# Patient Record
Sex: FEMALE | Race: WHITE | NOT HISPANIC OR LATINO | Employment: FULL TIME | URBAN - METROPOLITAN AREA
[De-identification: names, ages, dates, MRNs, and addresses within clinical notes are randomized per-mention and may not be internally consistent; named-entity substitution may affect disease eponyms.]

---

## 2024-10-02 ENCOUNTER — HOSPITAL ENCOUNTER (OUTPATIENT)
Dept: RADIOLOGY | Facility: MEDICAL CENTER | Age: 34
End: 2024-10-02
Attending: STUDENT IN AN ORGANIZED HEALTH CARE EDUCATION/TRAINING PROGRAM
Payer: COMMERCIAL

## 2024-10-02 DIAGNOSIS — Z12.5 SPECIAL SCREENING FOR MALIGNANT NEOPLASM OF PROSTATE: ICD-10-CM

## 2024-10-02 DIAGNOSIS — Z80.3 FAMILY HISTORY OF MALIGNANT NEOPLASM OF BREAST: ICD-10-CM

## 2024-10-02 PROCEDURE — 77067 SCR MAMMO BI INCL CAD: CPT

## 2024-10-14 ENCOUNTER — HOSPITAL ENCOUNTER (OUTPATIENT)
Dept: RADIOLOGY | Facility: MEDICAL CENTER | Age: 34
End: 2024-10-14
Attending: STUDENT IN AN ORGANIZED HEALTH CARE EDUCATION/TRAINING PROGRAM
Payer: COMMERCIAL

## 2024-10-14 DIAGNOSIS — R92.8 ABNORMAL MAMMOGRAM: ICD-10-CM

## 2024-10-14 PROCEDURE — 76642 ULTRASOUND BREAST LIMITED: CPT | Mod: LT

## 2024-10-15 ENCOUNTER — HOSPITAL ENCOUNTER (OUTPATIENT)
Dept: RADIOLOGY | Facility: MEDICAL CENTER | Age: 34
End: 2024-10-15
Attending: STUDENT IN AN ORGANIZED HEALTH CARE EDUCATION/TRAINING PROGRAM
Payer: COMMERCIAL

## 2024-10-15 DIAGNOSIS — R92.8 ABNORMAL FINDING ON BREAST IMAGING: ICD-10-CM

## 2024-10-15 LAB — PATHOLOGY CONSULT NOTE: NORMAL

## 2024-10-15 PROCEDURE — 88305 TISSUE EXAM BY PATHOLOGIST: CPT

## 2024-10-15 PROCEDURE — 19083 BX BREAST 1ST LESION US IMAG: CPT | Mod: LT

## 2024-10-16 ENCOUNTER — TELEPHONE (OUTPATIENT)
Dept: RADIOLOGY | Facility: MEDICAL CENTER | Age: 34
End: 2024-10-16
Payer: COMMERCIAL

## 2024-12-10 ENCOUNTER — TELEPHONE (OUTPATIENT)
Dept: MATERNAL FETAL MEDICINE | Facility: MEDICAL CENTER | Age: 34
End: 2024-12-10

## 2024-12-10 ENCOUNTER — ANCILLARY PROCEDURE (OUTPATIENT)
Dept: MATERNAL FETAL MEDICINE | Facility: MEDICAL CENTER | Age: 34
End: 2024-12-10
Payer: COMMERCIAL

## 2024-12-10 VITALS
HEART RATE: 121 BPM | BODY MASS INDEX: 20.99 KG/M2 | WEIGHT: 150.4 LBS | DIASTOLIC BLOOD PRESSURE: 95 MMHG | SYSTOLIC BLOOD PRESSURE: 159 MMHG

## 2024-12-10 DIAGNOSIS — R03.0 ELEVATED BLOOD PRESSURE READING: ICD-10-CM

## 2024-12-10 DIAGNOSIS — R03.0 ELEVATED BLOOD-PRESSURE READING, WITHOUT DIAGNOSIS OF HYPERTENSION: ICD-10-CM

## 2024-12-10 DIAGNOSIS — Z3A.12 12 WEEKS GESTATION OF PREGNANCY: ICD-10-CM

## 2024-12-10 DIAGNOSIS — O09.521 MULTIGRAVIDA OF ADVANCED MATERNAL AGE IN FIRST TRIMESTER: ICD-10-CM

## 2024-12-10 DIAGNOSIS — O09.521 AMA (ADVANCED MATERNAL AGE) MULTIGRAVIDA 35+, FIRST TRIMESTER: ICD-10-CM

## 2024-12-10 DIAGNOSIS — Z14.8 CARRIER OF GENETIC DISORDER: ICD-10-CM

## 2024-12-10 DIAGNOSIS — O09.511 PRIMIGRAVIDA OF ADVANCED MATERNAL AGE IN FIRST TRIMESTER: ICD-10-CM

## 2024-12-10 LAB
APPEARANCE UR: CLEAR
BILIRUB UR STRIP-MCNC: NEGATIVE MG/DL
COLOR UR AUTO: YELLOW
GLUCOSE UR STRIP.AUTO-MCNC: NEGATIVE MG/DL
KETONES UR STRIP.AUTO-MCNC: NEGATIVE MG/DL
LEUKOCYTE ESTERASE UR QL STRIP.AUTO: NEGATIVE
NITRITE UR QL STRIP.AUTO: NEGATIVE
PH UR STRIP.AUTO: 7.5 [PH] (ref 5–8)
PROT UR QL STRIP: NORMAL MG/DL
RBC UR QL AUTO: NEGATIVE
SP GR UR STRIP.AUTO: 1.02
UROBILINOGEN UR STRIP-MCNC: NORMAL MG/DL

## 2024-12-10 PROCEDURE — 99204 OFFICE O/P NEW MOD 45 MIN: CPT | Performed by: OBSTETRICS & GYNECOLOGY

## 2024-12-10 PROCEDURE — 81002 URINALYSIS NONAUTO W/O SCOPE: CPT | Performed by: OBSTETRICS & GYNECOLOGY

## 2024-12-10 PROCEDURE — 76813 OB US NUCHAL MEAS 1 GEST: CPT | Performed by: OBSTETRICS & GYNECOLOGY

## 2024-12-10 PROCEDURE — 76801 OB US < 14 WKS SINGLE FETUS: CPT | Performed by: OBSTETRICS & GYNECOLOGY

## 2024-12-10 NOTE — TELEPHONE ENCOUNTER
Patient brought in home blood pressure machine to cross check with ours. These were her readings.  Ours: 165/98  Her's:161/112

## 2024-12-13 ENCOUNTER — TELEPHONE (OUTPATIENT)
Dept: MATERNAL FETAL MEDICINE | Facility: MEDICAL CENTER | Age: 34
End: 2024-12-13
Payer: COMMERCIAL

## 2024-12-13 NOTE — TELEPHONE ENCOUNTER
I called patient to get her blood pressure reports from the last time she was in Office due to her being hypertensive. These were her reports.  Tuesday- 6:50 /74  Wednesday- 7:52 /77, 5:50 /80  Thursday- 7:47 /76, 12:11 /80, 7:30 /71  Friday- 9:20 /82

## 2025-01-21 ENCOUNTER — APPOINTMENT (OUTPATIENT)
Dept: MATERNAL FETAL MEDICINE | Facility: MEDICAL CENTER | Age: 35
End: 2025-01-21
Payer: COMMERCIAL

## 2025-01-21 VITALS
DIASTOLIC BLOOD PRESSURE: 93 MMHG | BODY MASS INDEX: 21.48 KG/M2 | HEART RATE: 142 BPM | WEIGHT: 153.9 LBS | SYSTOLIC BLOOD PRESSURE: 159 MMHG

## 2025-01-21 DIAGNOSIS — O35.BXX0 FETAL CARDIAC ANOMALY COMPLICATING PREGNANCY, ANTEPARTUM, NOT APPLICABLE OR UNSPECIFIED FETUS: ICD-10-CM

## 2025-01-21 DIAGNOSIS — O09.511 PRIMIGRAVIDA OF ADVANCED MATERNAL AGE IN FIRST TRIMESTER: ICD-10-CM

## 2025-01-21 DIAGNOSIS — Z3A.18 18 WEEKS GESTATION OF PREGNANCY: ICD-10-CM

## 2025-01-21 DIAGNOSIS — O09.522 AMA (ADVANCED MATERNAL AGE) MULTIGRAVIDA 35+, SECOND TRIMESTER: ICD-10-CM

## 2025-01-21 PROCEDURE — 76817 TRANSVAGINAL US OBSTETRIC: CPT | Performed by: OBSTETRICS & GYNECOLOGY

## 2025-01-21 PROCEDURE — 76811 OB US DETAILED SNGL FETUS: CPT | Performed by: OBSTETRICS & GYNECOLOGY

## 2025-01-21 PROCEDURE — 99212 OFFICE O/P EST SF 10 MIN: CPT | Performed by: OBSTETRICS & GYNECOLOGY

## 2025-05-06 ENCOUNTER — NON-PROVIDER VISIT (OUTPATIENT)
Dept: MATERNAL FETAL MEDICINE | Facility: MEDICAL CENTER | Age: 35
End: 2025-05-06
Payer: COMMERCIAL

## 2025-05-06 ENCOUNTER — APPOINTMENT (OUTPATIENT)
Dept: MATERNAL FETAL MEDICINE | Facility: MEDICAL CENTER | Age: 35
End: 2025-05-06
Attending: OBSTETRICS & GYNECOLOGY
Payer: COMMERCIAL

## 2025-05-06 ENCOUNTER — HOSPITAL ENCOUNTER (EMERGENCY)
Facility: MEDICAL CENTER | Age: 35
End: 2025-05-06
Attending: OBSTETRICS & GYNECOLOGY | Admitting: OBSTETRICS & GYNECOLOGY
Payer: COMMERCIAL

## 2025-05-06 VITALS — SYSTOLIC BLOOD PRESSURE: 150 MMHG | DIASTOLIC BLOOD PRESSURE: 91 MMHG | HEART RATE: 103 BPM

## 2025-05-06 VITALS
DIASTOLIC BLOOD PRESSURE: 100 MMHG | SYSTOLIC BLOOD PRESSURE: 180 MMHG | BODY MASS INDEX: 24.34 KG/M2 | HEART RATE: 149 BPM | WEIGHT: 174.4 LBS

## 2025-05-06 DIAGNOSIS — O09.523 AMA (ADVANCED MATERNAL AGE) MULTIGRAVIDA 35+, THIRD TRIMESTER: ICD-10-CM

## 2025-05-06 DIAGNOSIS — Z3A.33 33 WEEKS GESTATION OF PREGNANCY: ICD-10-CM

## 2025-05-06 DIAGNOSIS — O47.03 PREMATURE UTERINE CONTRACTIONS, ANTEPARTUM, THIRD TRIMESTER: ICD-10-CM

## 2025-05-06 DIAGNOSIS — R03.0 ELEVATED BLOOD PRESSURE READING: ICD-10-CM

## 2025-05-06 DIAGNOSIS — O35.BXX0 FETAL CARDIAC ANOMALY COMPLICATING PREGNANCY, ANTEPARTUM, NOT APPLICABLE OR UNSPECIFIED FETUS: ICD-10-CM

## 2025-05-06 DIAGNOSIS — O09.511 PRIMIGRAVIDA OF ADVANCED MATERNAL AGE IN FIRST TRIMESTER: ICD-10-CM

## 2025-05-06 LAB
ALBUMIN SERPL BCP-MCNC: 3.4 G/DL (ref 3.2–4.9)
ALBUMIN/GLOB SERPL: 1.1 G/DL
ALP SERPL-CCNC: 96 U/L (ref 30–99)
ALT SERPL-CCNC: 15 U/L (ref 2–50)
ANION GAP SERPL CALC-SCNC: 12 MMOL/L (ref 7–16)
APPEARANCE UR: CLEAR
AST SERPL-CCNC: 16 U/L (ref 12–45)
BILIRUB SERPL-MCNC: <0.2 MG/DL (ref 0.1–1.5)
BILIRUB UR QL STRIP.AUTO: NEGATIVE
BILIRUB UR QL STRIP.AUTO: NEGATIVE
BILIRUB UR STRIP-MCNC: NEGATIVE MG/DL
BUN SERPL-MCNC: 11 MG/DL (ref 8–22)
CALCIUM ALBUM COR SERPL-MCNC: 9.6 MG/DL (ref 8.5–10.5)
CALCIUM SERPL-MCNC: 9.1 MG/DL (ref 8.5–10.5)
CHLORIDE SERPL-SCNC: 104 MMOL/L (ref 96–112)
CO2 SERPL-SCNC: 19 MMOL/L (ref 20–33)
COLOR UR AUTO: YELLOW
COLOR UR AUTO: YELLOW
COLOR UR: YELLOW
CREAT SERPL-MCNC: 0.64 MG/DL (ref 0.5–1.4)
CREAT UR-MCNC: 40.5 MG/DL
ERYTHROCYTE [DISTWIDTH] IN BLOOD BY AUTOMATED COUNT: 44.1 FL (ref 35.9–50)
FIBRONECTIN FETAL SPEC QL: NEGATIVE
GFR SERPLBLD CREATININE-BSD FMLA CKD-EPI: 118 ML/MIN/1.73 M 2
GLOBULIN SER CALC-MCNC: 3.2 G/DL (ref 1.9–3.5)
GLUCOSE SERPL-MCNC: 81 MG/DL (ref 65–99)
GLUCOSE UR QL STRIP.AUTO: NEGATIVE MG/DL
GLUCOSE UR STRIP.AUTO-MCNC: NEGATIVE MG/DL
GLUCOSE UR STRIP.AUTO-MCNC: NEGATIVE MG/DL
HCT VFR BLD AUTO: 35.8 % (ref 37–47)
HGB BLD-MCNC: 12.1 G/DL (ref 12–16)
KETONES UR QL STRIP.AUTO: NEGATIVE MG/DL
KETONES UR STRIP.AUTO-MCNC: NEGATIVE MG/DL
KETONES UR STRIP.AUTO-MCNC: NEGATIVE MG/DL
LEUKOCYTE ESTERASE UR QL STRIP.AUTO: NEGATIVE
MCH RBC QN AUTO: 31.5 PG (ref 27–33)
MCHC RBC AUTO-ENTMCNC: 33.8 G/DL (ref 32.2–35.5)
MCV RBC AUTO: 93.2 FL (ref 81.4–97.8)
MICRO URNS: NORMAL
NITRITE UR QL STRIP.AUTO: NEGATIVE
PH UR STRIP.AUTO: 7 [PH] (ref 5–8)
PLATELET # BLD AUTO: 287 K/UL (ref 164–446)
PMV BLD AUTO: 10.4 FL (ref 9–12.9)
POTASSIUM SERPL-SCNC: 3.8 MMOL/L (ref 3.6–5.5)
PROT SERPL-MCNC: 6.6 G/DL (ref 6–8.2)
PROT UR QL STRIP: NEGATIVE MG/DL
PROT UR QL STRIP: NEGATIVE MG/DL
PROT UR QL STRIP: NORMAL MG/DL
PROT UR-MCNC: <6 MG/DL (ref 0–15)
PROT/CREAT UR: 148 MG/G (ref 10–107)
RBC # BLD AUTO: 3.84 M/UL (ref 4.2–5.4)
RBC UR QL AUTO: NEGATIVE
SODIUM SERPL-SCNC: 135 MMOL/L (ref 135–145)
SP GR UR STRIP.AUTO: 1.01
SP GR UR STRIP.AUTO: 1.01 (ref 1–1.03)
SP GR UR STRIP.AUTO: 1.02
UROBILINOGEN UR STRIP-MCNC: 0.2 MG/DL
UROBILINOGEN UR STRIP.AUTO-MCNC: 0.2 EU/DL
UROBILINOGEN UR STRIP.AUTO-MCNC: 0.2 MG/DL
WBC # BLD AUTO: 14.7 K/UL (ref 4.8–10.8)

## 2025-05-06 PROCEDURE — 85027 COMPLETE CBC AUTOMATED: CPT

## 2025-05-06 PROCEDURE — 76816 OB US FOLLOW-UP PER FETUS: CPT | Performed by: OBSTETRICS & GYNECOLOGY

## 2025-05-06 PROCEDURE — 80053 COMPREHEN METABOLIC PANEL: CPT

## 2025-05-06 PROCEDURE — 82731 ASSAY OF FETAL FIBRONECTIN: CPT

## 2025-05-06 PROCEDURE — 59025 FETAL NON-STRESS TEST: CPT

## 2025-05-06 PROCEDURE — 81002 URINALYSIS NONAUTO W/O SCOPE: CPT | Performed by: OBSTETRICS & GYNECOLOGY

## 2025-05-06 PROCEDURE — 82570 ASSAY OF URINE CREATININE: CPT

## 2025-05-06 PROCEDURE — 84156 ASSAY OF PROTEIN URINE: CPT

## 2025-05-06 PROCEDURE — 36415 COLL VENOUS BLD VENIPUNCTURE: CPT

## 2025-05-06 PROCEDURE — 81002 URINALYSIS NONAUTO W/O SCOPE: CPT

## 2025-05-06 PROCEDURE — 59025 FETAL NON-STRESS TEST: CPT | Performed by: OBSTETRICS & GYNECOLOGY

## 2025-05-06 PROCEDURE — 99283 EMERGENCY DEPT VISIT LOW MDM: CPT

## 2025-05-06 PROCEDURE — 81003 URINALYSIS AUTO W/O SCOPE: CPT

## 2025-05-07 NOTE — PROGRESS NOTES
35 y.o.  @ 33.6 sent over from Dr. Boogie's office for pretrm contractions. Pt denies feeling UC's, reports positive fetal movement, denies vaginal bleeding, or LOF. EFM & [place] applied. Initial /87  Pt states she has anxiety when at Dr office, and her bp gets high in office. Pt tracks BP at home and has log.   PIH labs ordered. SSE FFN collected. SVE closed/thick/high.   Report to Marylou CARDONA RN. Care relinquished.

## 2025-05-07 NOTE — ED PROVIDER NOTES
Department of Obstetrics and Gynecology  Labor and Delivery Progress Note    ID: 35 y.o. , at 33+6    Primary Obsetrician: Viky Amador M.D.    CC: sent from [facility]'s office for possible  labor    S: Pt was seen at Dr. Boogie's office today for growth US for AMA. During the US exam uterine contractions were seen, and pt was placed on the monitor which showed contractions on toco. She was then sent up to L&D for  labor eval. Pt reports having felt that contraction during the US, but otherwise it's intermittent tightening or baby moving, nothing that would keep her awake at night or from working.    Pt has had multiple elevated Bps in the office, both with [facility] at at [facility] throughout her pregnancy. Pt has a home BP cuff that has been cross checked in Dr. Boogie's office. She brings her log with her that shows all normal BP. In the office BP is often 150s/90s. Today she denies pre-e symptoms.    O: BP Range 134/85 - 150/95  Gen: NAD, AAO x3  Ext: Cx visually closed per RN    FHT: 140 baseline, moderate variability, accels, no decels, reactive  [place]: 1-2 in 10 min avg'd over 20 min     Latest Reference Range & Units 25 16:40 25 17:25 25 17:51   WBC 4.8 - 10.8 K/uL   14.7 (H)   Hemoglobin 12.0 - 16.0 g/dL   12.1   Hematocrit 37.0 - 47.0 %   35.8 (L)   Platelet Count 164 - 446 K/uL   287   Creatinine 0.50 - 1.40 mg/dL   0.64   AST(SGOT) 12 - 45 U/L   16   ALT(SGPT) 2 - 50 U/L   15   Protein Creatinine Ratio 10 - 107 mg/g 148 (H)     Fetal Fibronectin   Negative    (H): Data is abnormally high  (L): Data is abnormally low    A/P: Mayra Vieira is a 35 y.o.  at 33+6.  AVSS.  Reactive FHT. Neg FFN  * Persistent elevated BP here but pt has maintained a detailed log with all normal BP at home. Advised pt to continue logging BP as she has been doing, and to come in if she has BP at home of 140/90 or higher. Reviewed pre-e symptoms to come in for.  * FFN is reassuring.  However, advised pt if ctxs become stronger and more frequent to return to L&D for labor check  * Will inform Dr. Amador of pt's eval in triage. Next appt scheduled in 1 week, will see if she wants to change that  The patient has received a screening exam, is not in labor, and is stable for discharge.    Radha Glasgow M.D., 5/6/2025, 7:58 PM

## 2025-05-28 ENCOUNTER — ANCILLARY PROCEDURE (OUTPATIENT)
Dept: MATERNAL FETAL MEDICINE | Facility: MEDICAL CENTER | Age: 35
End: 2025-05-28
Attending: OBSTETRICS & GYNECOLOGY
Payer: COMMERCIAL

## 2025-05-28 ENCOUNTER — HOSPITAL ENCOUNTER (EMERGENCY)
Facility: MEDICAL CENTER | Age: 35
End: 2025-05-28
Attending: OBSTETRICS & GYNECOLOGY | Admitting: OBSTETRICS & GYNECOLOGY
Payer: COMMERCIAL

## 2025-05-28 VITALS
TEMPERATURE: 99 F | OXYGEN SATURATION: 97 % | WEIGHT: 179 LBS | DIASTOLIC BLOOD PRESSURE: 77 MMHG | BODY MASS INDEX: 25.06 KG/M2 | HEIGHT: 71 IN | SYSTOLIC BLOOD PRESSURE: 141 MMHG | RESPIRATION RATE: 16 BRPM | HEART RATE: 118 BPM

## 2025-05-28 VITALS
DIASTOLIC BLOOD PRESSURE: 102 MMHG | SYSTOLIC BLOOD PRESSURE: 177 MMHG | HEART RATE: 150 BPM | WEIGHT: 179.1 LBS | BODY MASS INDEX: 24.99 KG/M2

## 2025-05-28 DIAGNOSIS — O09.511 PRIMIGRAVIDA OF ADVANCED MATERNAL AGE IN FIRST TRIMESTER: ICD-10-CM

## 2025-05-28 LAB
ALBUMIN SERPL BCP-MCNC: 3.4 G/DL (ref 3.2–4.9)
ALBUMIN/GLOB SERPL: 1 G/DL
ALP SERPL-CCNC: 117 U/L (ref 30–99)
ALT SERPL-CCNC: 17 U/L (ref 2–50)
ANION GAP SERPL CALC-SCNC: 11 MMOL/L (ref 7–16)
AST SERPL-CCNC: 21 U/L (ref 12–45)
BASOPHILS # BLD AUTO: 0.3 % (ref 0–1.8)
BASOPHILS # BLD: 0.03 K/UL (ref 0–0.12)
BILIRUB SERPL-MCNC: <0.2 MG/DL (ref 0.1–1.5)
BUN SERPL-MCNC: 12 MG/DL (ref 8–22)
CALCIUM ALBUM COR SERPL-MCNC: 10 MG/DL (ref 8.5–10.5)
CALCIUM SERPL-MCNC: 9.5 MG/DL (ref 8.5–10.5)
CHLORIDE SERPL-SCNC: 101 MMOL/L (ref 96–112)
CO2 SERPL-SCNC: 20 MMOL/L (ref 20–33)
CREAT SERPL-MCNC: 0.9 MG/DL (ref 0.5–1.4)
CREAT UR-MCNC: 48.9 MG/DL
EOSINOPHIL # BLD AUTO: 0.02 K/UL (ref 0–0.51)
EOSINOPHIL NFR BLD: 0.2 % (ref 0–6.9)
ERYTHROCYTE [DISTWIDTH] IN BLOOD BY AUTOMATED COUNT: 44.5 FL (ref 35.9–50)
GFR SERPLBLD CREATININE-BSD FMLA CKD-EPI: 85 ML/MIN/1.73 M 2
GLOBULIN SER CALC-MCNC: 3.4 G/DL (ref 1.9–3.5)
GLUCOSE SERPL-MCNC: 101 MG/DL (ref 65–99)
HCT VFR BLD AUTO: 38 % (ref 37–47)
HGB BLD-MCNC: 12.7 G/DL (ref 12–16)
IMM GRANULOCYTES # BLD AUTO: 0.06 K/UL (ref 0–0.11)
IMM GRANULOCYTES NFR BLD AUTO: 0.5 % (ref 0–0.9)
LYMPHOCYTES # BLD AUTO: 2.2 K/UL (ref 1–4.8)
LYMPHOCYTES NFR BLD: 18.8 % (ref 22–41)
MCH RBC QN AUTO: 31.3 PG (ref 27–33)
MCHC RBC AUTO-ENTMCNC: 33.4 G/DL (ref 32.2–35.5)
MCV RBC AUTO: 93.6 FL (ref 81.4–97.8)
MONOCYTES # BLD AUTO: 0.96 K/UL (ref 0–0.85)
MONOCYTES NFR BLD AUTO: 8.2 % (ref 0–13.4)
NEUTROPHILS # BLD AUTO: 8.45 K/UL (ref 1.82–7.42)
NEUTROPHILS NFR BLD: 72 % (ref 44–72)
NRBC # BLD AUTO: 0 K/UL
NRBC BLD-RTO: 0 /100 WBC (ref 0–0.2)
PLATELET # BLD AUTO: 303 K/UL (ref 164–446)
PMV BLD AUTO: 10.4 FL (ref 9–12.9)
POTASSIUM SERPL-SCNC: 4.1 MMOL/L (ref 3.6–5.5)
PROT SERPL-MCNC: 6.8 G/DL (ref 6–8.2)
PROT UR-MCNC: <6 MG/DL (ref 0–15)
PROT/CREAT UR: 123 MG/G (ref 10–107)
RBC # BLD AUTO: 4.06 M/UL (ref 4.2–5.4)
SODIUM SERPL-SCNC: 132 MMOL/L (ref 135–145)
WBC # BLD AUTO: 11.7 K/UL (ref 4.8–10.8)

## 2025-05-28 PROCEDURE — 80053 COMPREHEN METABOLIC PANEL: CPT

## 2025-05-28 PROCEDURE — 84156 ASSAY OF PROTEIN URINE: CPT

## 2025-05-28 PROCEDURE — 82570 ASSAY OF URINE CREATININE: CPT

## 2025-05-28 PROCEDURE — 36415 COLL VENOUS BLD VENIPUNCTURE: CPT

## 2025-05-28 PROCEDURE — 59025 FETAL NON-STRESS TEST: CPT

## 2025-05-28 PROCEDURE — 99283 EMERGENCY DEPT VISIT LOW MDM: CPT

## 2025-05-28 PROCEDURE — 85025 COMPLETE CBC W/AUTO DIFF WBC: CPT

## 2025-05-28 ASSESSMENT — FIBROSIS 4 INDEX
FIB4 SCORE: 0.5
FIB4 SCORE: 0.5

## 2025-05-28 ASSESSMENT — PAIN SCALES - GENERAL: PAINLEVEL: 0 - NO PAIN

## 2025-05-28 NOTE — PROGRESS NOTES
Pt presents to [facility] from [facility] for a PIH work up. Pt ambulated to [facility] 6 for assessment. Pt to provide urine sample.     1612 TOCO and EFM applied, RN will take serial BP's for further evaluation. Pt reports +FM, denies LOF or VB. Pt states she feels like she only has high BP when she's in front of a physician, she monitors her BP's at home and they are 120's/80's. Pt denies any other symptoms related to PIH/Pre-E.    1625 Dr. Torres at bedside, orders received.      1800 Dr. Torres at bedside, POC discussed. Plan for IOL next week, orders for discharge received.     1815 RN at bedside, labor and PIH precautions given. All questions answered.     1820 Pt discharged home in stable condition.

## 2025-05-28 NOTE — ED PROVIDER NOTES
"OB ED Note    CC:Sent from the clinic with elevated blood pressures    HPI: Criss Vieira is a 35-year-old G2, P0 who presented to her [facility] prenatal appointment today for her scheduled prenatal care.  Her blood pressure was severe range elevated.  She was sent for evaluation.  She reports a history of chronically elevated blood pressures with this pregnancy in office.  She has been monitoring her blood pressures at home and all of them have been within normal limits.  She is on aspirin 81 mg.  She sees [facility] for  ASD and VSD.  Currently she denies headaches, visual disturbances, upper abdominal pain.  She denies contractions, bleeding, or leaking fluid.    BP (!) 141/77   Pulse (!) 118   Temp 37.2 °C (99 °F)   Resp 16   Ht 1.803 m (5' 11\")   Wt 81.2 kg (179 lb)   SpO2 97%   BMI 24.97 kg/m²     Her blood pressure ranged from 133-150/71-79    General: No apparent distress  Abdomen: Gravid, nontender  Extremities: No edema, normal reflexes, no clonus    FHT: Baseline of 130s, moderate variability present, accelerations present, decelerations absent  Tocometer: Irregular contractions    Recent Labs     25  1652   WBC 11.7*   RBC 4.06*   HEMOGLOBIN 12.7   HEMATOCRIT 38.0   MCV 93.6   MCH 31.3   RDW 44.5   PLATELETCT 303   MPV 10.4   NEUTSPOLYS 72.00   LYMPHOCYTES 18.80*   MONOCYTES 8.20   EOSINOPHILS 0.20   BASOPHILS 0.30     Creatinine: 0.9  AST: 21  ALT: 17  PC ratio 123    Assessment:  Term IUP at 37 weeks 0 days  Chronic hypertension with no evidence of superimposed preeclampsia or severe features    Plan:  I reviewed the vital signs and laboratory evaluation with the patient.  Her hypertension is difficult to categorize.  Likely it is chronic in nature however it is only evident in the clinical setting and returns to normal upon checking at home.  Her cuff is been validated and compared with the office.  Given this information, these blood pressures are not severe range and they are not any higher " than she normally gets in the clinical setting.  Her preeclampsia laboratory evaluations unremarkable.  Her PC ratio was normal.  Reassuring fetal status has been secured.  We talked about the risks and benefits of induction today given the difficulty at categorizing her versus waiting until 38 weeks.  She desires to wait an additional week.  She will continue to monitor her blood pressure at home.  Warning signs for and return discussed.    Sun Torres M.D.

## 2025-06-03 ENCOUNTER — HOSPITAL ENCOUNTER (INPATIENT)
Facility: MEDICAL CENTER | Age: 35
LOS: 5 days | End: 2025-06-09
Attending: OBSTETRICS & GYNECOLOGY | Admitting: OBSTETRICS & GYNECOLOGY
Payer: COMMERCIAL

## 2025-06-03 ENCOUNTER — APPOINTMENT (OUTPATIENT)
Dept: OBGYN | Facility: MEDICAL CENTER | Age: 35
End: 2025-06-03
Attending: OBSTETRICS & GYNECOLOGY
Payer: COMMERCIAL

## 2025-06-03 DIAGNOSIS — G89.18 POSTOPERATIVE PAIN: Primary | ICD-10-CM

## 2025-06-03 PROCEDURE — 86900 BLOOD TYPING SEROLOGIC ABO: CPT

## 2025-06-03 PROCEDURE — 85025 COMPLETE CBC W/AUTO DIFF WBC: CPT

## 2025-06-03 PROCEDURE — 80053 COMPREHEN METABOLIC PANEL: CPT

## 2025-06-03 PROCEDURE — 36415 COLL VENOUS BLD VENIPUNCTURE: CPT

## 2025-06-03 PROCEDURE — 86901 BLOOD TYPING SEROLOGIC RH(D): CPT

## 2025-06-03 PROCEDURE — 86780 TREPONEMA PALLIDUM: CPT

## 2025-06-03 ASSESSMENT — FIBROSIS 4 INDEX: FIB4 SCORE: 0.59

## 2025-06-04 LAB
ALBUMIN SERPL BCP-MCNC: 3.3 G/DL (ref 3.2–4.9)
ALBUMIN SERPL BCP-MCNC: 3.4 G/DL (ref 3.2–4.9)
ALBUMIN/GLOB SERPL: 1 G/DL
ALBUMIN/GLOB SERPL: 1.1 G/DL
ALP SERPL-CCNC: 124 U/L (ref 30–99)
ALP SERPL-CCNC: 134 U/L (ref 30–99)
ALT SERPL-CCNC: 14 U/L (ref 2–50)
ALT SERPL-CCNC: 16 U/L (ref 2–50)
ANION GAP SERPL CALC-SCNC: 12 MMOL/L (ref 7–16)
ANION GAP SERPL CALC-SCNC: 12 MMOL/L (ref 7–16)
AST SERPL-CCNC: 17 U/L (ref 12–45)
AST SERPL-CCNC: 20 U/L (ref 12–45)
BASOPHILS # BLD AUTO: 0.3 % (ref 0–1.8)
BASOPHILS # BLD: 0.03 K/UL (ref 0–0.12)
BILIRUB SERPL-MCNC: <0.2 MG/DL (ref 0.1–1.5)
BILIRUB SERPL-MCNC: <0.2 MG/DL (ref 0.1–1.5)
BUN SERPL-MCNC: 8 MG/DL (ref 8–22)
BUN SERPL-MCNC: 9 MG/DL (ref 8–22)
CALCIUM ALBUM COR SERPL-MCNC: 9 MG/DL (ref 8.5–10.5)
CALCIUM ALBUM COR SERPL-MCNC: 9.7 MG/DL (ref 8.5–10.5)
CALCIUM SERPL-MCNC: 8.4 MG/DL (ref 8.5–10.5)
CALCIUM SERPL-MCNC: 9.2 MG/DL (ref 8.5–10.5)
CHLORIDE SERPL-SCNC: 102 MMOL/L (ref 96–112)
CHLORIDE SERPL-SCNC: 106 MMOL/L (ref 96–112)
CO2 SERPL-SCNC: 19 MMOL/L (ref 20–33)
CO2 SERPL-SCNC: 20 MMOL/L (ref 20–33)
CREAT SERPL-MCNC: 0.54 MG/DL (ref 0.5–1.4)
CREAT SERPL-MCNC: 0.58 MG/DL (ref 0.5–1.4)
CREAT UR-MCNC: 63.1 MG/DL
EOSINOPHIL # BLD AUTO: 0.08 K/UL (ref 0–0.51)
EOSINOPHIL NFR BLD: 0.7 % (ref 0–6.9)
ERYTHROCYTE [DISTWIDTH] IN BLOOD BY AUTOMATED COUNT: 43.5 FL (ref 35.9–50)
GFR SERPLBLD CREATININE-BSD FMLA CKD-EPI: 121 ML/MIN/1.73 M 2
GFR SERPLBLD CREATININE-BSD FMLA CKD-EPI: 123 ML/MIN/1.73 M 2
GLOBULIN SER CALC-MCNC: 3.2 G/DL (ref 1.9–3.5)
GLOBULIN SER CALC-MCNC: 3.3 G/DL (ref 1.9–3.5)
GLUCOSE SERPL-MCNC: 107 MG/DL (ref 65–99)
GLUCOSE SERPL-MCNC: 119 MG/DL (ref 65–99)
HCT VFR BLD AUTO: 37.1 % (ref 37–47)
HGB BLD-MCNC: 12.5 G/DL (ref 12–16)
HOLDING TUBE BB 8507: NORMAL
IMM GRANULOCYTES # BLD AUTO: 0.06 K/UL (ref 0–0.11)
IMM GRANULOCYTES NFR BLD AUTO: 0.5 % (ref 0–0.9)
LYMPHOCYTES # BLD AUTO: 2.93 K/UL (ref 1–4.8)
LYMPHOCYTES NFR BLD: 26.1 % (ref 22–41)
MAGNESIUM SERPL-MCNC: 4 MG/DL (ref 1.5–2.5)
MAGNESIUM SERPL-MCNC: 4.5 MG/DL (ref 1.5–2.5)
MAGNESIUM SERPL-MCNC: 5.4 MG/DL (ref 1.5–2.5)
MCH RBC QN AUTO: 31.1 PG (ref 27–33)
MCHC RBC AUTO-ENTMCNC: 33.7 G/DL (ref 32.2–35.5)
MCV RBC AUTO: 92.3 FL (ref 81.4–97.8)
MONOCYTES # BLD AUTO: 1.01 K/UL (ref 0–0.85)
MONOCYTES NFR BLD AUTO: 9 % (ref 0–13.4)
NEUTROPHILS # BLD AUTO: 7.13 K/UL (ref 1.82–7.42)
NEUTROPHILS NFR BLD: 63.4 % (ref 44–72)
NRBC # BLD AUTO: 0 K/UL
NRBC BLD-RTO: 0 /100 WBC (ref 0–0.2)
PLATELET # BLD AUTO: 295 K/UL (ref 164–446)
PMV BLD AUTO: 11.3 FL (ref 9–12.9)
POTASSIUM SERPL-SCNC: 3.9 MMOL/L (ref 3.6–5.5)
POTASSIUM SERPL-SCNC: 3.9 MMOL/L (ref 3.6–5.5)
PROT SERPL-MCNC: 6.6 G/DL (ref 6–8.2)
PROT SERPL-MCNC: 6.6 G/DL (ref 6–8.2)
PROT UR-MCNC: 12.2 MG/DL (ref 0–15)
PROT/CREAT UR: 193 MG/G (ref 10–107)
RBC # BLD AUTO: 4.02 M/UL (ref 4.2–5.4)
SODIUM SERPL-SCNC: 133 MMOL/L (ref 135–145)
SODIUM SERPL-SCNC: 138 MMOL/L (ref 135–145)
T PALLIDUM AB SER QL IA: NORMAL
WBC # BLD AUTO: 11.2 K/UL (ref 4.8–10.8)

## 2025-06-04 PROCEDURE — 36415 COLL VENOUS BLD VENIPUNCTURE: CPT

## 2025-06-04 PROCEDURE — 700102 HCHG RX REV CODE 250 W/ 637 OVERRIDE(OP): Performed by: OBSTETRICS & GYNECOLOGY

## 2025-06-04 PROCEDURE — 80053 COMPREHEN METABOLIC PANEL: CPT

## 2025-06-04 PROCEDURE — 700111 HCHG RX REV CODE 636 W/ 250 OVERRIDE (IP): Mod: JZ | Performed by: OBSTETRICS & GYNECOLOGY

## 2025-06-04 PROCEDURE — A9270 NON-COVERED ITEM OR SERVICE: HCPCS | Performed by: OBSTETRICS & GYNECOLOGY

## 2025-06-04 PROCEDURE — 83735 ASSAY OF MAGNESIUM: CPT

## 2025-06-04 PROCEDURE — 3E0P7VZ INTRODUCTION OF HORMONE INTO FEMALE REPRODUCTIVE, VIA NATURAL OR ARTIFICIAL OPENING: ICD-10-PCS | Performed by: OBSTETRICS & GYNECOLOGY

## 2025-06-04 PROCEDURE — 84156 ASSAY OF PROTEIN URINE: CPT

## 2025-06-04 PROCEDURE — 82570 ASSAY OF URINE CREATININE: CPT

## 2025-06-04 PROCEDURE — 700105 HCHG RX REV CODE 258: Performed by: OBSTETRICS & GYNECOLOGY

## 2025-06-04 PROCEDURE — 770002 HCHG ROOM/CARE - OB PRIVATE (112)

## 2025-06-04 RX ORDER — SODIUM CHLORIDE, SODIUM LACTATE, POTASSIUM CHLORIDE, CALCIUM CHLORIDE 600; 310; 30; 20 MG/100ML; MG/100ML; MG/100ML; MG/100ML
INJECTION, SOLUTION INTRAVENOUS CONTINUOUS
Status: DISCONTINUED | OUTPATIENT
Start: 2025-06-04 | End: 2025-06-04

## 2025-06-04 RX ORDER — ALUMINA, MAGNESIA, AND SIMETHICONE 2400; 2400; 240 MG/30ML; MG/30ML; MG/30ML
30 SUSPENSION ORAL EVERY 6 HOURS PRN
Status: DISCONTINUED | OUTPATIENT
Start: 2025-06-04 | End: 2025-06-06 | Stop reason: HOSPADM

## 2025-06-04 RX ORDER — MAGNESIUM SULFATE HEPTAHYDRATE 40 MG/ML
2 INJECTION, SOLUTION INTRAVENOUS ONCE
Status: COMPLETED | OUTPATIENT
Start: 2025-06-04 | End: 2025-06-04

## 2025-06-04 RX ORDER — HYDRALAZINE HYDROCHLORIDE 20 MG/ML
5-10 INJECTION INTRAMUSCULAR; INTRAVENOUS
Status: DISCONTINUED | OUTPATIENT
Start: 2025-06-04 | End: 2025-06-06 | Stop reason: HOSPADM

## 2025-06-04 RX ORDER — LABETALOL HYDROCHLORIDE 5 MG/ML
20-80 INJECTION, SOLUTION INTRAVENOUS
Status: DISCONTINUED | OUTPATIENT
Start: 2025-06-04 | End: 2025-06-06 | Stop reason: HOSPADM

## 2025-06-04 RX ORDER — TERBUTALINE SULFATE 1 MG/ML
0.25 INJECTION SUBCUTANEOUS
Status: DISCONTINUED | OUTPATIENT
Start: 2025-06-04 | End: 2025-06-06 | Stop reason: HOSPADM

## 2025-06-04 RX ORDER — DIPHENHYDRAMINE HYDROCHLORIDE 50 MG/ML
25 INJECTION, SOLUTION INTRAMUSCULAR; INTRAVENOUS ONCE
Status: COMPLETED | OUTPATIENT
Start: 2025-06-04 | End: 2025-06-04

## 2025-06-04 RX ORDER — ONDANSETRON 4 MG/1
4 TABLET, ORALLY DISINTEGRATING ORAL EVERY 6 HOURS PRN
Status: DISCONTINUED | OUTPATIENT
Start: 2025-06-04 | End: 2025-06-06 | Stop reason: HOSPADM

## 2025-06-04 RX ORDER — SODIUM CHLORIDE, SODIUM LACTATE, POTASSIUM CHLORIDE, AND CALCIUM CHLORIDE .6; .31; .03; .02 G/100ML; G/100ML; G/100ML; G/100ML
250 INJECTION, SOLUTION INTRAVENOUS PRN
Status: DISCONTINUED | OUTPATIENT
Start: 2025-06-04 | End: 2025-06-06 | Stop reason: HOSPADM

## 2025-06-04 RX ORDER — LIDOCAINE HYDROCHLORIDE 10 MG/ML
20 INJECTION, SOLUTION INFILTRATION; PERINEURAL
Status: DISCONTINUED | OUTPATIENT
Start: 2025-06-04 | End: 2025-06-06 | Stop reason: HOSPADM

## 2025-06-04 RX ORDER — EPHEDRINE SULFATE 50 MG/ML
5 INJECTION, SOLUTION INTRAVENOUS
Status: DISCONTINUED | OUTPATIENT
Start: 2025-06-04 | End: 2025-06-06 | Stop reason: HOSPADM

## 2025-06-04 RX ORDER — ONDANSETRON 2 MG/ML
4 INJECTION INTRAMUSCULAR; INTRAVENOUS EVERY 6 HOURS PRN
Status: DISCONTINUED | OUTPATIENT
Start: 2025-06-04 | End: 2025-06-06 | Stop reason: HOSPADM

## 2025-06-04 RX ORDER — MAGNESIUM SULFATE HEPTAHYDRATE 40 MG/ML
4 INJECTION, SOLUTION INTRAVENOUS ONCE
Status: COMPLETED | OUTPATIENT
Start: 2025-06-04 | End: 2025-06-04

## 2025-06-04 RX ORDER — NIFEDIPINE 10 MG/1
10 CAPSULE ORAL
Status: DISCONTINUED | OUTPATIENT
Start: 2025-06-04 | End: 2025-06-06 | Stop reason: HOSPADM

## 2025-06-04 RX ORDER — MAGNESIUM SULFATE HEPTAHYDRATE 40 MG/ML
2 INJECTION, SOLUTION INTRAVENOUS CONTINUOUS
Status: DISCONTINUED | OUTPATIENT
Start: 2025-06-04 | End: 2025-06-06

## 2025-06-04 RX ORDER — CALCIUM GLUCONATE 98 MG/ML
1 INJECTION, SOLUTION INTRAVENOUS
Status: DISCONTINUED | OUTPATIENT
Start: 2025-06-04 | End: 2025-06-06 | Stop reason: HOSPADM

## 2025-06-04 RX ORDER — SODIUM CHLORIDE, SODIUM LACTATE, POTASSIUM CHLORIDE, AND CALCIUM CHLORIDE .6; .31; .03; .02 G/100ML; G/100ML; G/100ML; G/100ML
1000 INJECTION, SOLUTION INTRAVENOUS
Status: COMPLETED | OUTPATIENT
Start: 2025-06-04 | End: 2025-06-05

## 2025-06-04 RX ORDER — OXYTOCIN 10 [USP'U]/ML
10 INJECTION, SOLUTION INTRAMUSCULAR; INTRAVENOUS
Status: DISCONTINUED | OUTPATIENT
Start: 2025-06-04 | End: 2025-06-06 | Stop reason: HOSPADM

## 2025-06-04 RX ORDER — SODIUM CHLORIDE, SODIUM LACTATE, POTASSIUM CHLORIDE, CALCIUM CHLORIDE 600; 310; 30; 20 MG/100ML; MG/100ML; MG/100ML; MG/100ML
INJECTION, SOLUTION INTRAVENOUS CONTINUOUS
Status: DISCONTINUED | OUTPATIENT
Start: 2025-06-04 | End: 2025-06-06

## 2025-06-04 RX ORDER — ROPIVACAINE HYDROCHLORIDE 2 MG/ML
INJECTION, SOLUTION EPIDURAL; INFILTRATION; PERINEURAL CONTINUOUS
Status: DISCONTINUED | OUTPATIENT
Start: 2025-06-04 | End: 2025-06-05

## 2025-06-04 RX ORDER — ACETAMINOPHEN 500 MG
1000 TABLET ORAL
Status: COMPLETED | OUTPATIENT
Start: 2025-06-04 | End: 2025-06-06

## 2025-06-04 RX ORDER — IBUPROFEN 800 MG/1
800 TABLET, FILM COATED ORAL
Status: DISCONTINUED | OUTPATIENT
Start: 2025-06-04 | End: 2025-06-06 | Stop reason: HOSPADM

## 2025-06-04 RX ADMIN — SODIUM CHLORIDE, POTASSIUM CHLORIDE, SODIUM LACTATE AND CALCIUM CHLORIDE: 600; 310; 30; 20 INJECTION, SOLUTION INTRAVENOUS at 16:17

## 2025-06-04 RX ADMIN — MAGNESIUM SULFATE HEPTAHYDRATE 2 G/HR: 40 INJECTION, SOLUTION INTRAVENOUS at 20:19

## 2025-06-04 RX ADMIN — OXYTOCIN 17 MILLI-UNITS/MIN: 10 INJECTION, SOLUTION INTRAMUSCULAR; INTRAVENOUS at 22:14

## 2025-06-04 RX ADMIN — NIFEDIPINE 10 MG: 10 CAPSULE ORAL at 01:05

## 2025-06-04 RX ADMIN — DIPHENHYDRAMINE HYDROCHLORIDE 25 MG: 50 INJECTION, SOLUTION INTRAMUSCULAR; INTRAVENOUS at 22:09

## 2025-06-04 RX ADMIN — OXYTOCIN 2 MILLI-UNITS/MIN: 10 INJECTION, SOLUTION INTRAMUSCULAR; INTRAVENOUS at 06:02

## 2025-06-04 RX ADMIN — MAGNESIUM SULFATE HEPTAHYDRATE 4 G: 4 INJECTION, SOLUTION INTRAVENOUS at 01:43

## 2025-06-04 RX ADMIN — SODIUM CHLORIDE, POTASSIUM CHLORIDE, SODIUM LACTATE AND CALCIUM CHLORIDE: 600; 310; 30; 20 INJECTION, SOLUTION INTRAVENOUS at 01:37

## 2025-06-04 RX ADMIN — MAGNESIUM SULFATE HEPTAHYDRATE 2 G: 2 INJECTION, SOLUTION INTRAVENOUS at 02:09

## 2025-06-04 RX ADMIN — MAGNESIUM SULFATE HEPTAHYDRATE 2 G/HR: 40 INJECTION, SOLUTION INTRAVENOUS at 02:22

## 2025-06-04 RX ADMIN — MISOPROSTOL 25 MCG: 100 TABLET ORAL at 00:39

## 2025-06-04 SDOH — ECONOMIC STABILITY: TRANSPORTATION INSECURITY
IN THE PAST 12 MONTHS, HAS LACK OF RELIABLE TRANSPORTATION KEPT YOU FROM MEDICAL APPOINTMENTS, MEETINGS, WORK OR FROM GETTING THINGS NEEDED FOR DAILY LIVING?: NO

## 2025-06-04 SDOH — ECONOMIC STABILITY: TRANSPORTATION INSECURITY
IN THE PAST 12 MONTHS, HAS THE LACK OF TRANSPORTATION KEPT YOU FROM MEDICAL APPOINTMENTS OR FROM GETTING MEDICATIONS?: NO

## 2025-06-04 ASSESSMENT — PAIN DESCRIPTION - PAIN TYPE
TYPE: ACUTE PAIN

## 2025-06-04 ASSESSMENT — PATIENT HEALTH QUESTIONNAIRE - PHQ9
1. LITTLE INTEREST OR PLEASURE IN DOING THINGS: NOT AT ALL
SUM OF ALL RESPONSES TO PHQ9 QUESTIONS 1 AND 2: 0
2. FEELING DOWN, DEPRESSED, IRRITABLE, OR HOPELESS: NOT AT ALL
SUM OF ALL RESPONSES TO PHQ9 QUESTIONS 1 AND 2: 0
1. LITTLE INTEREST OR PLEASURE IN DOING THINGS: NOT AT ALL
2. FEELING DOWN, DEPRESSED, IRRITABLE, OR HOPELESS: NOT AT ALL

## 2025-06-04 ASSESSMENT — SOCIAL DETERMINANTS OF HEALTH (SDOH)
WITHIN THE PAST 12 MONTHS, YOU WORRIED THAT YOUR FOOD WOULD RUN OUT BEFORE YOU GOT THE MONEY TO BUY MORE: NEVER TRUE
IN THE PAST 12 MONTHS, HAS THE ELECTRIC, GAS, OIL, OR WATER COMPANY THREATENED TO SHUT OFF SERVICE IN YOUR HOME?: NO
WITHIN THE LAST YEAR, HAVE YOU BEEN HUMILIATED OR EMOTIONALLY ABUSED IN OTHER WAYS BY YOUR PARTNER OR EX-PARTNER?: NO
WITHIN THE LAST YEAR, HAVE YOU BEEN AFRAID OF YOUR PARTNER OR EX-PARTNER?: NO
WITHIN THE LAST YEAR, HAVE YOU BEEN KICKED, HIT, SLAPPED, OR OTHERWISE PHYSICALLY HURT BY YOUR PARTNER OR EX-PARTNER?: NO
WITHIN THE PAST 12 MONTHS, THE FOOD YOU BOUGHT JUST DIDN'T LAST AND YOU DIDN'T HAVE MONEY TO GET MORE: NEVER TRUE
WITHIN THE LAST YEAR, HAVE TO BEEN RAPED OR FORCED TO HAVE ANY KIND OF SEXUAL ACTIVITY BY YOUR PARTNER OR EX-PARTNER?: NO

## 2025-06-04 ASSESSMENT — LIFESTYLE VARIABLES
DOES PATIENT WANT TO STOP DRINKING: NO
ALCOHOL_USE: NO

## 2025-06-04 ASSESSMENT — PAIN SCALES - GENERAL: PAINLEVEL: 0 - NO PAIN

## 2025-06-04 NOTE — PROGRESS NOTES
2332: Patient presents to labor and delivery for scheduled IOL for AMA and HTN. Patient declines HA, vision changes, SOB, LOF, VB, or contractions. Patient reports +FM. IV started labs drawn.     0011: Report given to Dr Hi. POC discussed. Orders received.     0655: Report given to Mitzi FONG RN. POC discussed. VSS. All questions answered and all needs met at this time. Care relinquished.

## 2025-06-04 NOTE — H&P
"OB H&P:    CC: induction of labor    HPI:  Ms. Mayra Vieira is a 35 y.o.  @ 38w0d by lmp c/w 7wk US with suspected chronic hypertension.  Pt reports her Bps are always normal at home; in clinic they are high most of the time since 9wks, up to 160s documented on flow sheet at19, 35, and 37 weeks. Was seen in triage for elevated Bps both at 34 and 37wks.     Prenatal care has otherwise been uncomplicated per pt. On review of record, baby has ASD and VSD, plan for  ECHO. Last growth US at 37wks, 3502g (92%).  NiPT low risk.      Denies HA/RUQ pain/vision changes.       PNL:  Rh+/-, RI, HIV neg, RPR NR, HBsAg NR, GC/CT neg/neg  Glucola 144 --> normal 3hr  GBS negative      ROS:  Const: denies fevers, general concerns  CV/resp: reports no concerns  GI: denies abd pain, GI concerns  : see HPI  Neuro: denies HA/vision changes    OB History    Para Term  AB Living   1 0 0 0 0 0   SAB IAB Ectopic Molar Multiple Live Births   0 0 0  0       # Outcome Date GA Lbr Suraj/2nd Weight Sex Type Anes PTL Lv   1 Current                GYN: no hx of cervical dysplasia requiring treatment, hx of oral HSV only    PMHx: suspected chronic hypertension  PSHx: wisdom teeth extraction    Meds: PNV, ASA 81mg daily    Family History   Problem Relation Age of Onset    Cancer Mother         breast at 47    Stroke Father     Heart Disease Father     Diabetes Neg Hx     Hypertension Neg Hx     Hyperlipidemia Neg Hx        SocHx: denies tob/drugs/EtOH    PE:  Vitals:    25 2338 25 2355   BP:  (!) 153/88   Pulse:  (!) 114   Resp: 17    Temp: 36.6 °C (97.8 °F)    TempSrc: Temporal    SpO2: 98%    Weight: 81.2 kg (179 lb)    Height: 1.803 m (5' 11\")      gen: AAO, NAD  abd: soft, gravid, NT  Confirmed vtx on U  Ext: NT, no edema; 3+ hyperreflexia, no clonus    SVE: 60 @ /2 per RN  FHT: 140/moderate variability/+ accels/ no decels  jac: no ctx    A/P: 35 y.o.  @ 38w0d by lmp c/w 7wk US scheduled for IOL " secondary to suspected cHTN now with superimposed preEclampsia  - IOL: starting with cytotec  - cHTN with superimposed preE: serum labs wnl, p/c pending.  Pt asymptomatic but Bps persistently severe, given nifedipine 10mg IR x1, treat as indicated and  will start magnesium sulfate for seizure prophylaxis, discussed with patient  - FHT: reactive and reassuring, no decels; continuous monitoring  - GBS neg  - pain: fentanyl vs epidural as desired  - baby with VSD/ASD - plan for  ECHO      Johnna Baker MD  [facility]

## 2025-06-04 NOTE — PROGRESS NOTES
0658 Report received from Lydia FRANZ.   [id number] portable pulse ox  1254 SVE by Dr. Vásquez around cervical balloon, see flowsheets. Patient tolerated well.   1712 Dr. Amador at bedside, balloon removed, SVE see flowsheets.   3608 Report given to Bertha FRANZ.

## 2025-06-04 NOTE — CARE PLAN
The patient is Watcher - Medium risk of patient condition declining or worsening    Shift Goals  Clinical Goals: Healthy mom and healthy baby  Patient Goals: Safe progression of labor  Family Goals: Support    Progress made toward(s) clinical / shift goals:    Problem: Knowledge Deficit - L&D  Goal: Patient and family/caregivers will demonstrate understanding of plan of care, disease process/condition, diagnostic tests and medications  Outcome: Progressing  Note: Patient encouraged to voice feelings and ask questions.     Problem: Pain  Goal: Patient's pain will be alleviated or reduced to the patient’s comfort goal  Outcome: Progressing  Note: Patient and RN discussed available interventions for pain.

## 2025-06-04 NOTE — PROGRESS NOTES
MD L&D progress note     S: Pt doing well, ctx getting uncomfortable    O: /72 afebrile  FHR - 130s, pos accels, neg decels, mod variability, cat 1 FHR  [place] - every 1-3 minutes  SVE - exam deferred, balloon in place on last exam by RN     A: IUP at 38+0wks, CHTN with superimposed PreE, GBS neg, AMA, fetal VSD/ASD     P: Continue labor management, fetal monitoring/toco, IV fluids, pitocin, mag sulfate

## 2025-06-04 NOTE — PROGRESS NOTES
Labor Progress Note:      S:  Pt doing well, no concerns.    Vitals:    25 0325 25 0355 25 0425 25 0455   BP: 130/80 134/75 126/76 (!) 156/91   Pulse: 83 77 80 82   Resp:       Temp:    35.9 °C (96.6 °F)   TempSrc:    Temporal   SpO2:       Weight:       Height:           SVE: 1/70/-2, cook balloon placed, 80/60cc    FHT: 130/moderate variability/+ accels/no decels  toco: q2-5min ctx    UOP: 500cc out previously, 300cc 1hr later    A/P: 35 y.o.  @ 38w0d by lmp c/w 7wk US here for IOL w/ cHTN, superimposed pre-eclampsia  - IOL: s/p cytotec, cook balloon placed and will start pitocin  - cHTN with superimposed preE: serum labs and p/c wnl.  S/p nifedipine 10mg IR x1, normal to mild range since.  Cont mag sulfate, UOP adequate  - FHT: reactive and reassuring, no decels; continuous monitoring  - GBS neg  - pain: fentanyl vs epidural as desired  - baby with VSD/ASD - plan for  ECHO       Johnna Baker MD  [facility]

## 2025-06-04 NOTE — CARE PLAN
The patient is Watcher - Medium risk of patient condition declining or worsening    Shift Goals  Clinical Goals: BPs WNL  Patient Goals: pain control cervical change  Family Goals: support and education    Progress made toward(s) clinical / shift goals:    Problem: Knowledge Deficit - L&D  Goal: Patient and family/caregivers will demonstrate understanding of plan of care, disease process/condition, diagnostic tests and medications  Outcome: Progressing  Note: Patient continually updated on the POC. Patient educated on the induction process, basic fetal heart tones, interventions as needed. Patient asks questions as they arise.      Problem: Risk for Fluid Imbalance  Goal: Patient's fluid volume balance will be maintained or improve  Outcome: Progressing  Note: Patient on magnesium sulfate for preclampsia. I/Os recorded. SCDs on patient. Patient educated on preeclampsia precautions to update the provider. Patient verbalizes understanding.        Patient is not progressing towards the following goals:

## 2025-06-05 ENCOUNTER — ANESTHESIA (OUTPATIENT)
Dept: OBGYN | Facility: MEDICAL CENTER | Age: 35
End: 2025-06-05
Payer: COMMERCIAL

## 2025-06-05 ENCOUNTER — ANESTHESIA EVENT (OUTPATIENT)
Dept: OBGYN | Facility: MEDICAL CENTER | Age: 35
End: 2025-06-05
Payer: COMMERCIAL

## 2025-06-05 LAB
ABO + RH BLD: NORMAL
ABO GROUP BLD: NORMAL
BLD GP AB SCN SERPL QL: NORMAL
MAGNESIUM SERPL-MCNC: 4.7 MG/DL (ref 1.5–2.5)
MAGNESIUM SERPL-MCNC: 4.8 MG/DL (ref 1.5–2.5)
MAGNESIUM SERPL-MCNC: 4.9 MG/DL (ref 1.5–2.5)
MAGNESIUM SERPL-MCNC: 6.1 MG/DL (ref 1.5–2.5)
RH BLD: NORMAL

## 2025-06-05 PROCEDURE — 700102 HCHG RX REV CODE 250 W/ 637 OVERRIDE(OP): Performed by: ANESTHESIOLOGY

## 2025-06-05 PROCEDURE — 160041 HCHG SURGERY MINUTES - EA ADDL 1 MIN LEVEL 4: Performed by: OBSTETRICS & GYNECOLOGY

## 2025-06-05 PROCEDURE — 160035 HCHG PACU - 1ST 60 MINS PHASE I: Performed by: OBSTETRICS & GYNECOLOGY

## 2025-06-05 PROCEDURE — 160036 HCHG PACU - EA ADDL 30 MINS PHASE I: Performed by: OBSTETRICS & GYNECOLOGY

## 2025-06-05 PROCEDURE — 700105 HCHG RX REV CODE 258: Performed by: OBSTETRICS & GYNECOLOGY

## 2025-06-05 PROCEDURE — 700111 HCHG RX REV CODE 636 W/ 250 OVERRIDE (IP): Performed by: STUDENT IN AN ORGANIZED HEALTH CARE EDUCATION/TRAINING PROGRAM

## 2025-06-05 PROCEDURE — 303615 HCHG EPIDURAL/SPINAL ANESTHESIA FOR LABOR

## 2025-06-05 PROCEDURE — 700101 HCHG RX REV CODE 250: Performed by: STUDENT IN AN ORGANIZED HEALTH CARE EDUCATION/TRAINING PROGRAM

## 2025-06-05 PROCEDURE — 700105 HCHG RX REV CODE 258: Performed by: STUDENT IN AN ORGANIZED HEALTH CARE EDUCATION/TRAINING PROGRAM

## 2025-06-05 PROCEDURE — A9270 NON-COVERED ITEM OR SERVICE: HCPCS | Performed by: ANESTHESIOLOGY

## 2025-06-05 PROCEDURE — C1755 CATHETER, INTRASPINAL: HCPCS | Performed by: OBSTETRICS & GYNECOLOGY

## 2025-06-05 PROCEDURE — 700111 HCHG RX REV CODE 636 W/ 250 OVERRIDE (IP): Performed by: ANESTHESIOLOGY

## 2025-06-05 PROCEDURE — 700111 HCHG RX REV CODE 636 W/ 250 OVERRIDE (IP): Mod: JZ | Performed by: OBSTETRICS & GYNECOLOGY

## 2025-06-05 PROCEDURE — 160029 HCHG SURGERY MINUTES - 1ST 30 MINS LEVEL 4: Performed by: OBSTETRICS & GYNECOLOGY

## 2025-06-05 PROCEDURE — 160009 HCHG ANES TIME/MIN: Performed by: OBSTETRICS & GYNECOLOGY

## 2025-06-05 PROCEDURE — A9270 NON-COVERED ITEM OR SERVICE: HCPCS | Performed by: OBSTETRICS & GYNECOLOGY

## 2025-06-05 PROCEDURE — 86901 BLOOD TYPING SEROLOGIC RH(D): CPT

## 2025-06-05 PROCEDURE — 700102 HCHG RX REV CODE 250 W/ 637 OVERRIDE(OP): Performed by: OBSTETRICS & GYNECOLOGY

## 2025-06-05 PROCEDURE — 700101 HCHG RX REV CODE 250: Performed by: OBSTETRICS & GYNECOLOGY

## 2025-06-05 PROCEDURE — 770002 HCHG ROOM/CARE - OB PRIVATE (112)

## 2025-06-05 PROCEDURE — 160048 HCHG OR STATISTICAL LEVEL 1-5: Performed by: OBSTETRICS & GYNECOLOGY

## 2025-06-05 PROCEDURE — 700105 HCHG RX REV CODE 258: Performed by: ANESTHESIOLOGY

## 2025-06-05 PROCEDURE — 36415 COLL VENOUS BLD VENIPUNCTURE: CPT

## 2025-06-05 PROCEDURE — 160002 HCHG RECOVERY MINUTES (STAT): Performed by: OBSTETRICS & GYNECOLOGY

## 2025-06-05 PROCEDURE — 86850 RBC ANTIBODY SCREEN: CPT

## 2025-06-05 PROCEDURE — 86900 BLOOD TYPING SEROLOGIC ABO: CPT

## 2025-06-05 PROCEDURE — 10907ZC DRAINAGE OF AMNIOTIC FLUID, THERAPEUTIC FROM PRODUCTS OF CONCEPTION, VIA NATURAL OR ARTIFICIAL OPENING: ICD-10-PCS | Performed by: OBSTETRICS & GYNECOLOGY

## 2025-06-05 PROCEDURE — 83735 ASSAY OF MAGNESIUM: CPT | Mod: 91

## 2025-06-05 PROCEDURE — 700101 HCHG RX REV CODE 250: Performed by: ANESTHESIOLOGY

## 2025-06-05 PROCEDURE — 700111 HCHG RX REV CODE 636 W/ 250 OVERRIDE (IP): Performed by: OBSTETRICS & GYNECOLOGY

## 2025-06-05 RX ORDER — BUPIVACAINE HYDROCHLORIDE 2.5 MG/ML
INJECTION, SOLUTION EPIDURAL; INFILTRATION; INTRACAUDAL; PERINEURAL
Status: COMPLETED
Start: 2025-06-05 | End: 2025-06-05

## 2025-06-05 RX ORDER — DEXAMETHASONE SODIUM PHOSPHATE 4 MG/ML
INJECTION, SOLUTION INTRA-ARTICULAR; INTRALESIONAL; INTRAMUSCULAR; INTRAVENOUS; SOFT TISSUE PRN
Status: DISCONTINUED | OUTPATIENT
Start: 2025-06-05 | End: 2025-06-06 | Stop reason: SURG

## 2025-06-05 RX ORDER — METOCLOPRAMIDE HYDROCHLORIDE 5 MG/ML
10 INJECTION INTRAMUSCULAR; INTRAVENOUS ONCE
Status: COMPLETED | OUTPATIENT
Start: 2025-06-05 | End: 2025-06-05

## 2025-06-05 RX ORDER — ROPIVACAINE HYDROCHLORIDE 2 MG/ML
INJECTION, SOLUTION EPIDURAL; INFILTRATION; PERINEURAL CONTINUOUS
Status: DISCONTINUED | OUTPATIENT
Start: 2025-06-05 | End: 2025-06-06

## 2025-06-05 RX ORDER — MISOPROSTOL 200 UG/1
TABLET ORAL PRN
Status: DISCONTINUED | OUTPATIENT
Start: 2025-06-05 | End: 2025-06-06 | Stop reason: SURG

## 2025-06-05 RX ORDER — AZITHROMYCIN 500 MG/5ML
500 INJECTION, POWDER, LYOPHILIZED, FOR SOLUTION INTRAVENOUS ONCE
Status: COMPLETED | OUTPATIENT
Start: 2025-06-05 | End: 2025-06-05

## 2025-06-05 RX ORDER — LIDOCAINE HYDROCHLORIDE AND EPINEPHRINE 15; 5 MG/ML; UG/ML
INJECTION, SOLUTION EPIDURAL
Status: COMPLETED | OUTPATIENT
Start: 2025-06-05 | End: 2025-06-05

## 2025-06-05 RX ORDER — TRANEXAMIC ACID 100 MG/ML
INJECTION, SOLUTION INTRAVENOUS PRN
Status: DISCONTINUED | OUTPATIENT
Start: 2025-06-05 | End: 2025-06-06 | Stop reason: SURG

## 2025-06-05 RX ORDER — LIDOCAINE HYDROCHLORIDE AND EPINEPHRINE 20; 5 MG/ML; UG/ML
INJECTION, SOLUTION EPIDURAL; INFILTRATION; INTRACAUDAL; PERINEURAL PRN
Status: DISCONTINUED | OUTPATIENT
Start: 2025-06-05 | End: 2025-06-06 | Stop reason: SURG

## 2025-06-05 RX ORDER — DEXTROSE, SODIUM CHLORIDE, SODIUM LACTATE, POTASSIUM CHLORIDE, AND CALCIUM CHLORIDE 5; .6; .31; .03; .02 G/100ML; G/100ML; G/100ML; G/100ML; G/100ML
INJECTION, SOLUTION INTRAVENOUS CONTINUOUS
Status: DISCONTINUED | OUTPATIENT
Start: 2025-06-05 | End: 2025-06-06

## 2025-06-05 RX ORDER — OXYTOCIN 10 [USP'U]/ML
INJECTION, SOLUTION INTRAMUSCULAR; INTRAVENOUS PRN
Status: DISCONTINUED | OUTPATIENT
Start: 2025-06-05 | End: 2025-06-06 | Stop reason: SURG

## 2025-06-05 RX ORDER — SODIUM CHLORIDE, SODIUM GLUCONATE, SODIUM ACETATE, POTASSIUM CHLORIDE AND MAGNESIUM CHLORIDE 526; 502; 368; 37; 30 MG/100ML; MG/100ML; MG/100ML; MG/100ML; MG/100ML
1000 INJECTION, SOLUTION INTRAVENOUS ONCE
Status: COMPLETED | OUTPATIENT
Start: 2025-06-05 | End: 2025-06-05

## 2025-06-05 RX ORDER — CEFAZOLIN SODIUM 1 G/3ML
INJECTION, POWDER, FOR SOLUTION INTRAMUSCULAR; INTRAVENOUS PRN
Status: DISCONTINUED | OUTPATIENT
Start: 2025-06-05 | End: 2025-06-06 | Stop reason: SURG

## 2025-06-05 RX ORDER — SODIUM CHLORIDE, SODIUM GLUCONATE, SODIUM ACETATE, POTASSIUM CHLORIDE AND MAGNESIUM CHLORIDE 526; 502; 368; 37; 30 MG/100ML; MG/100ML; MG/100ML; MG/100ML; MG/100ML
INJECTION, SOLUTION INTRAVENOUS
Status: DISCONTINUED | OUTPATIENT
Start: 2025-06-05 | End: 2025-06-06 | Stop reason: SURG

## 2025-06-05 RX ORDER — ONDANSETRON 2 MG/ML
INJECTION INTRAMUSCULAR; INTRAVENOUS PRN
Status: DISCONTINUED | OUTPATIENT
Start: 2025-06-05 | End: 2025-06-06 | Stop reason: SURG

## 2025-06-05 RX ORDER — BUPIVACAINE HYDROCHLORIDE 2.5 MG/ML
INJECTION, SOLUTION EPIDURAL; INFILTRATION; INTRACAUDAL; PERINEURAL
Status: COMPLETED | OUTPATIENT
Start: 2025-06-05 | End: 2025-06-05

## 2025-06-05 RX ORDER — CITRIC ACID/SODIUM CITRATE 334-500MG
30 SOLUTION, ORAL ORAL ONCE
Status: COMPLETED | OUTPATIENT
Start: 2025-06-05 | End: 2025-06-05

## 2025-06-05 RX ORDER — MISOPROSTOL 200 UG/1
800 TABLET ORAL ONCE
Status: ACTIVE | OUTPATIENT
Start: 2025-06-05 | End: 2025-06-06

## 2025-06-05 RX ADMIN — ONDANSETRON 8 MG: 2 INJECTION INTRAMUSCULAR; INTRAVENOUS at 23:42

## 2025-06-05 RX ADMIN — ROPIVACAINE HYDROCHLORIDE: 2 INJECTION, SOLUTION EPIDURAL; INFILTRATION; PERINEURAL at 11:02

## 2025-06-05 RX ADMIN — FAMOTIDINE 20 MG: 10 INJECTION, SOLUTION INTRAVENOUS at 22:12

## 2025-06-05 RX ADMIN — FENTANYL CITRATE 100 MCG: 50 INJECTION, SOLUTION INTRAMUSCULAR; INTRAVENOUS at 23:16

## 2025-06-05 RX ADMIN — ROPIVACAINE HYDROCHLORIDE: 2 INJECTION, SOLUTION EPIDURAL; INFILTRATION; PERINEURAL at 02:57

## 2025-06-05 RX ADMIN — ROPIVACAINE HYDROCHLORIDE: 2 INJECTION, SOLUTION EPIDURAL; INFILTRATION; PERINEURAL at 21:37

## 2025-06-05 RX ADMIN — SODIUM CHLORIDE, POTASSIUM CHLORIDE, SODIUM LACTATE AND CALCIUM CHLORIDE 1000 ML: 600; 310; 30; 20 INJECTION, SOLUTION INTRAVENOUS at 02:40

## 2025-06-05 RX ADMIN — MISOPROSTOL 400 MCG: 200 TABLET ORAL at 23:45

## 2025-06-05 RX ADMIN — SODIUM CITRATE AND CITRIC ACID MONOHYDRATE 30 ML: 2004; 3000 SOLUTION ORAL at 22:11

## 2025-06-05 RX ADMIN — OXYTOCIN 21 MILLI-UNITS/MIN: 10 INJECTION, SOLUTION INTRAMUSCULAR; INTRAVENOUS at 19:05

## 2025-06-05 RX ADMIN — BUPIVACAINE HYDROCHLORIDE 5 ML: 2.5 INJECTION, SOLUTION EPIDURAL; INFILTRATION; INTRACAUDAL at 02:50

## 2025-06-05 RX ADMIN — METOCLOPRAMIDE 10 MG: 5 INJECTION, SOLUTION INTRAMUSCULAR; INTRAVENOUS at 22:12

## 2025-06-05 RX ADMIN — OXYTOCIN 20 UNITS: 10 INJECTION, SOLUTION INTRAMUSCULAR; INTRAVENOUS at 23:42

## 2025-06-05 RX ADMIN — FENTANYL CITRATE 100 MCG: 50 INJECTION, SOLUTION INTRAMUSCULAR; INTRAVENOUS at 08:25

## 2025-06-05 RX ADMIN — BUPIVACAINE HYDROCHLORIDE 8 ML: 2.5 INJECTION, SOLUTION EPIDURAL; INFILTRATION; INTRACAUDAL at 08:25

## 2025-06-05 RX ADMIN — NIFEDIPINE 10 MG: 10 CAPSULE ORAL at 16:31

## 2025-06-05 RX ADMIN — SODIUM CHLORIDE, SODIUM GLUCONATE, SODIUM ACETATE, POTASSIUM CHLORIDE AND MAGNESIUM CHLORIDE 1000 ML: 526; 502; 368; 37; 30 INJECTION, SOLUTION INTRAVENOUS at 23:10

## 2025-06-05 RX ADMIN — TRANEXAMIC ACID 1000 MG: 100 INJECTION, SOLUTION INTRAVENOUS at 23:16

## 2025-06-05 RX ADMIN — AZITHROMYCIN 500 MG: 500 INJECTION, POWDER, LYOPHILIZED, FOR SOLUTION INTRAVENOUS at 22:11

## 2025-06-05 RX ADMIN — BUPIVACAINE HYDROCHLORIDE 8 ML: 2.5 INJECTION, SOLUTION EPIDURAL; INFILTRATION; INTRACAUDAL at 06:27

## 2025-06-05 RX ADMIN — CEFAZOLIN 2 G: 1 INJECTION, POWDER, FOR SOLUTION INTRAMUSCULAR; INTRAVENOUS at 23:15

## 2025-06-05 RX ADMIN — LIDOCAINE HYDROCHLORIDE AND EPINEPHRINE 10 ML: 20; 5 INJECTION, SOLUTION EPIDURAL; INFILTRATION; INTRACAUDAL; PERINEURAL at 23:21

## 2025-06-05 RX ADMIN — NIFEDIPINE 10 MG: 10 CAPSULE ORAL at 07:29

## 2025-06-05 RX ADMIN — SODIUM CHLORIDE, SODIUM LACTATE, POTASSIUM CHLORIDE, CALCIUM CHLORIDE AND DEXTROSE MONOHYDRATE: 5; 600; 310; 30; 20 INJECTION, SOLUTION INTRAVENOUS at 13:12

## 2025-06-05 RX ADMIN — DEXAMETHASONE SODIUM PHOSPHATE 10 MG: 4 INJECTION INTRA-ARTICULAR; INTRALESIONAL; INTRAMUSCULAR; INTRAVENOUS; SOFT TISSUE at 23:42

## 2025-06-05 RX ADMIN — ROPIVACAINE HYDROCHLORIDE: 2 INJECTION, SOLUTION EPIDURAL; INFILTRATION; PERINEURAL at 16:37

## 2025-06-05 RX ADMIN — LIDOCAINE HYDROCHLORIDE,EPINEPHRINE BITARTRATE 3 ML: 15; .005 INJECTION, SOLUTION EPIDURAL; INFILTRATION; INTRACAUDAL; PERINEURAL at 02:56

## 2025-06-05 RX ADMIN — MAGNESIUM SULFATE HEPTAHYDRATE 2 G/HR: 40 INJECTION, SOLUTION INTRAVENOUS at 14:28

## 2025-06-05 RX ADMIN — SODIUM CHLORIDE, SODIUM GLUCONATE, SODIUM ACETATE, POTASSIUM CHLORIDE AND MAGNESIUM CHLORIDE: 526; 502; 368; 37; 30 INJECTION, SOLUTION INTRAVENOUS at 23:13

## 2025-06-05 RX ADMIN — OXYTOCIN 10 MILLI-UNITS/MIN: 10 INJECTION, SOLUTION INTRAMUSCULAR; INTRAVENOUS at 09:59

## 2025-06-05 RX ADMIN — NIFEDIPINE 10 MG: 10 CAPSULE ORAL at 05:57

## 2025-06-05 RX ADMIN — LIDOCAINE HYDROCHLORIDE AND EPINEPHRINE 10 ML: 20; 5 INJECTION, SOLUTION EPIDURAL; INFILTRATION; INTRACAUDAL; PERINEURAL at 23:16

## 2025-06-05 ASSESSMENT — PAIN DESCRIPTION - PAIN TYPE
TYPE: ACUTE PAIN

## 2025-06-05 NOTE — PROGRESS NOTES
1900 bedside report received from Mitzi FONG RN. Care assumed.    1910 POC reviewed with pt. All questions answered. Pain management options reviewed with pt. Pt reports feeling anxious, answered questions regarding POC    2200 MD Vásquez at bedside. Orders received for IV benadryl for sleep. Per MD, ok to not to take BP while pt sleeping.    0212 MD Amador at bedside. SVE as charted. Pt would like epidural before AROM.  0240 Pt sitting for epidural  0245 Dr. Clark at bedside. Informed consent obtained.  0250 time out for procedure  0255 cath placed  0256 test dose  0321 VS reported to MD. Orders received to give a little time for pt's anxiety to settle, and if pressures don't come down, do protocol.    0534 MD Amador at bedside. AROM, SVE as charted.     0615 call to Dr. Clark for epidural bolus. Pt breathing through contractions and reporting significant pain even after PCA pressed.    0640 pt reports some relief following epidural bolus now.     0655 bedside report give to Mitzi FONG RN. Care relinquished.

## 2025-06-05 NOTE — PROGRESS NOTES
Labor Progress Note    Mayra Vieira   38w1d      Subjective:  Uterine contractions:yes  Pain: No    Objective:   Vitals:    06/05/25 0800 06/05/25 0830 06/05/25 0837 06/05/25 0900   BP: (!) 145/73 (!) 140/76 130/71 134/65   Pulse: 88 88 93 86   Resp:       Temp:       TempSrc:       SpO2:       Weight:       Height:         Fetal heart variability: moderate  Fetal Heart Rate decelerations: none  Fetal Heart Rate accelerations: yes  Baseline FHR: 140 per minute  Uterine contractions: irregular, every 3-4 minutes  Cervical: 75% ;  Dilatation .7 ; Station negative2    Membranes ruptured: .yes    Meds:   Epidural : .yes  Pitocin: .yes    Labs:  Recent Results (from the past 24 hours)   SERUM MAGNESIUM LEVELS EVERY 6 HRS UNTIL DESIRED RANGE (5-8 MG/DL) ACHIEVED    Collection Time: 06/04/25 10:41 AM   Result Value Ref Range    Magnesium 4.5 (H) 1.5 - 2.5 mg/dL   SERUM MAGNESIUM LEVELS EVERY 6 HRS UNTIL DESIRED RANGE (5-8 MG/DL) ACHIEVED    Collection Time: 06/04/25  4:27 PM   Result Value Ref Range    Magnesium 5.4 (H) 1.5 - 2.5 mg/dL   SERUM MAGNESIUM LEVELS EVERY 6 HRS UNTIL DESIRED RANGE (5-8 MG/DL) ACHIEVED    Collection Time: 06/05/25  4:24 AM   Result Value Ref Range    Magnesium 4.7 (H) 1.5 - 2.5 mg/dL       Assessment:   38w1d  Labor State: Active phase labor.      Saritha Sims M.D.

## 2025-06-05 NOTE — PROGRESS NOTES
OB MD Progress Note:    Pt doing well, no pain with ctx.  Afebrile, 130s/80s  FHR category 1  [place] not tracing well, pitocin at 17mIU  SVE 6/80/-1, bulging bag of water  Discussed w/ pt her current options - her cervix is not changing despite pitocin, her bag of water is intact. I recommend we do AROM. Discussed options for AROM - can do before epidural or get after epidural.  Pt also has a . She wants minimal intervention if possible. After our discussion, patient would like epidural and then AROM. Will get pt epidural then re-evaluate.

## 2025-06-05 NOTE — CARE PLAN
The patient is Watcher - Medium risk of patient condition declining or worsening    Shift Goals  Clinical Goals: Cervical dilation, vaginal delivery  Patient Goals: healthy mom and baby  Family Goals: support    Progress made toward(s) clinical / shift goals:       Problem: Knowledge Deficit - L&D  Goal: Patient and family/caregivers will demonstrate understanding of plan of care, disease process/condition, diagnostic tests and medications  Outcome: Progressing     Problem: Pain  Goal: Patient's pain will be alleviated or reduced to the patient’s comfort goal  Outcome: Progressing  Note: Patient states pain is more controlled with epidural, using breathing technique through contractions.

## 2025-06-05 NOTE — CARE PLAN
The patient is Watcher - Medium risk of patient condition declining or worsening    Shift Goals  Clinical Goals: Healthy mom and healthy baby  Patient Goals: Safe progression of labor  Family Goals: Support    Progress made toward(s) clinical / shift goals:  Anxiety is reduced after reviewing POC and pain management options      Problem: Knowledge Deficit - L&D  Goal: Patient and family/caregivers will demonstrate understanding of plan of care, disease process/condition, diagnostic tests and medications  Outcome: POC discussed at [facility]; pain management options reviewed with patient.     Problem: Psychosocial - L&D  Goal: Patient's level of anxiety will decrease  Outcome: Anxiety is reduced with extensive review of POC

## 2025-06-05 NOTE — ANESTHESIA PROCEDURE NOTES
Epidural Block    Date/Time: 6/5/2025 2:50 AM    Performed by: Yoni Clark MD, PhD  Authorized by: Yoni Clark MD, PhD    Patient Location:  OB  Start Time:  6/5/2025 2:50 AM  End Time:  6/5/2025 2:56 AM  Reason for Block: labor analgesia    patient identified, IV checked, site marked, risks and benefits discussed, surgical consent, monitors and equipment checked, pre-op evaluation and timeout performed    Patient Position:  Sitting  Prep: ChloraPrep, patient draped and sterile technique    Monitoring:  Blood pressure, continuous pulse oximetry and heart rate  Approach:  Midline  Location:  L4-L5  Injection Technique:  SUMAN saline  Skin infiltration:  Lidocaine  Strength:  1%  Dose:  3ml  Needle Type:  Tuohy  Needle Gauge:  17 G  Needle Length:  3.5 in  Loss of resistance::  5  Catheter Size:  19 G  Catheter at Skin Depth:  11  Test Dose Result:  Negative

## 2025-06-05 NOTE — PROGRESS NOTES
OB MD progress Note:    Pt doing well, no complaints  Vitals:    06/04/25 1630 06/04/25 1645 06/04/25 1700 06/04/25 1715   BP:    (!) 155/89   Pulse: (!) 112 81 93 96   Resp:    16   Temp:    36.1 °C (97 °F)   TempSrc:    Temporal   SpO2: 93% 98% (!) 81% 98%   Weight:       Height:         FHR category 1  [place] Q 3-4 min  SVE - balloon removed, cervix is 5-6/70/-2    Will have pt up to bed on birthing ball, cont pitocin, consider AROM at next exam, pt considering epidural prior to AROM

## 2025-06-05 NOTE — PROGRESS NOTES
OB MD Progress Note:    Pt comfortable with epidural, she agrees now to have AROM  Vitals:    06/05/25 0538 06/05/25 0553 06/05/25 0607 06/05/25 0622   BP: (!) 154/113 (!) 163/83 (!) 158/79 (!) 151/79   Pulse: 81 76 77 94   Resp:       Temp: 36.2 °C (97.2 °F)      TempSrc: Temporal      SpO2:       Weight:       Height:         FHR category 1  [place] not tracing well, but appear to be every 2-4 minutes while on back  SVE 6/80/-1, AROM clear    Will cont w/ labor mgmt, treat severe range pressures prn, position changes, anticipate vaginal delivery

## 2025-06-05 NOTE — ANESTHESIA PREPROCEDURE EVALUATION
Date: 25  Procedure: Labor Epidural       36 yo F  w/ pre-eclampsia requesting labor epidural  Relevant Problems   No relevant active problems       Physical Exam    Airway   Mallampati: II  TM distance: >3 FB  Neck ROM: full       Cardiovascular - normal exam  Rhythm: regular  Rate: normal    (-) murmur     Dental - normal exam           Pulmonary - normal examBreath sounds clear to auscultation     Abdominal    Neurological - normal exam                   Anesthesia Plan    ASA 2       Plan - epidural   Neuraxial block will be labor analgesia                  Pertinent diagnostic labs and testing reviewed    Informed Consent:    Anesthetic plan and risks discussed with patient.

## 2025-06-05 NOTE — PROGRESS NOTES
Labor Progress Note    Mayra Vieira   38w1d      Subjective:  Uterine contractions:yes  Pain: Yes. Severity: mild    Objective:   Vitals:    06/05/25 1256 06/05/25 1324 06/05/25 1400 06/05/25 1500   BP: (!) 154/90 137/75 137/81 (!) 142/75   Pulse: 100 87 96 (!) 107   Resp:       Temp:  36.4 °C (97.5 °F) 36.3 °C (97.4 °F)    TempSrc:   Temporal    SpO2:    92%   Weight:       Height:         Fetal heart variability: moderate  Fetal Heart Rate decelerations: none  Fetal Heart Rate accelerations: yes  Baseline FHR: 130 per minute  Uterine contractions: regular, every 2 minutes, poor amplitude  Cervical: 100% ;  Dilatation .AL ; Station negative1    Membranes ruptured: .yes    Meds:   Epidural : .yes  Pitocin: .yes    Labs:  Recent Results (from the past 24 hours)   SERUM MAGNESIUM LEVELS EVERY 6 HRS UNTIL DESIRED RANGE (5-8 MG/DL) ACHIEVED    Collection Time: 06/04/25  4:27 PM   Result Value Ref Range    Magnesium 5.4 (H) 1.5 - 2.5 mg/dL   SERUM MAGNESIUM LEVELS EVERY 6 HRS UNTIL DESIRED RANGE (5-8 MG/DL) ACHIEVED    Collection Time: 06/05/25  4:24 AM   Result Value Ref Range    Magnesium 4.7 (H) 1.5 - 2.5 mg/dL   SERUM MAGNESIUM LEVELS EVERY 6 HRS UNTIL DESIRED RANGE (5-8 MG/DL) ACHIEVED    Collection Time: 06/05/25 10:58 AM   Result Value Ref Range    Magnesium 4.8 (H) 1.5 - 2.5 mg/dL       Assessment:   38w1d  Labor State: Prolonged active phase labor.  Unchanged now for 2 hours, fetal status is reassuring  Patient is having contractions but extremely poor amplitude, not adequate already on 21 milliunits post Pitocin will continue to increase Pitocin to 24  Recheck in 2 hours if unchanged will consider discontinuing Pitocin for 30 minutes and restart        Saritha Sims M.D.

## 2025-06-05 NOTE — PROGRESS NOTES
0655 Report received from Bertha FRANZ.    0725 Dr. Amador notified of severe range blood pressure, ok to follow hypertensive protocol, see MAR.     0730 Dr. Sims to bedside.    0825 Patient stating epidural is not working as well as before, Dr. Palacios to bedside for epidural bolus. Rate change to 14 per Dr. Palacios. Patient educated on bolus button for breakthrough pain.     0914 Dr. Sims to bedside for SVE see flowsheets.     1102 Dr. Sims to bedside, SVE and IUPC placed.     1409 Dr. Sims to bedside, SVE see flowsheets.     1455 Patient complaining of increased pressure, SVE unchanged, see flowsheets.     1601 Dr. Sims to bedside, SVE unchanged see flowsheets.     1725 Patient reports increased pressure, SVE see flowsheets.     1848 Performed practice pushes.     1915 Report given to Abhishek FARNZ.

## 2025-06-06 LAB
MAGNESIUM SERPL-MCNC: 4.3 MG/DL (ref 1.5–2.5)
MAGNESIUM SERPL-MCNC: 4.6 MG/DL (ref 1.5–2.5)
MAGNESIUM SERPL-MCNC: 4.7 MG/DL (ref 1.5–2.5)
MAGNESIUM SERPL-MCNC: 4.8 MG/DL (ref 1.5–2.5)

## 2025-06-06 PROCEDURE — 83735 ASSAY OF MAGNESIUM: CPT

## 2025-06-06 PROCEDURE — 36415 COLL VENOUS BLD VENIPUNCTURE: CPT

## 2025-06-06 PROCEDURE — 700111 HCHG RX REV CODE 636 W/ 250 OVERRIDE (IP): Mod: JZ | Performed by: ANESTHESIOLOGY

## 2025-06-06 PROCEDURE — 700102 HCHG RX REV CODE 250 W/ 637 OVERRIDE(OP): Performed by: OBSTETRICS & GYNECOLOGY

## 2025-06-06 PROCEDURE — A9270 NON-COVERED ITEM OR SERVICE: HCPCS | Performed by: OBSTETRICS & GYNECOLOGY

## 2025-06-06 PROCEDURE — A9270 NON-COVERED ITEM OR SERVICE: HCPCS | Performed by: ANESTHESIOLOGY

## 2025-06-06 PROCEDURE — 700111 HCHG RX REV CODE 636 W/ 250 OVERRIDE (IP): Performed by: OBSTETRICS & GYNECOLOGY

## 2025-06-06 PROCEDURE — 770002 HCHG ROOM/CARE - OB PRIVATE (112)

## 2025-06-06 PROCEDURE — 700105 HCHG RX REV CODE 258: Performed by: OBSTETRICS & GYNECOLOGY

## 2025-06-06 PROCEDURE — 700102 HCHG RX REV CODE 250 W/ 637 OVERRIDE(OP): Performed by: ANESTHESIOLOGY

## 2025-06-06 RX ORDER — ACETAMINOPHEN 500 MG
1000 TABLET ORAL EVERY 6 HOURS
Status: COMPLETED | OUTPATIENT
Start: 2025-06-06 | End: 2025-06-07

## 2025-06-06 RX ORDER — MAGNESIUM SULFATE HEPTAHYDRATE 40 MG/ML
4 INJECTION, SOLUTION INTRAVENOUS ONCE
Status: ACTIVE | OUTPATIENT
Start: 2025-06-06 | End: 2025-06-07

## 2025-06-06 RX ORDER — CALCIUM GLUCONATE 98 MG/ML
1 INJECTION, SOLUTION INTRAVENOUS
Status: DISCONTINUED | OUTPATIENT
Start: 2025-06-06 | End: 2025-06-08

## 2025-06-06 RX ORDER — HYDROMORPHONE HYDROCHLORIDE 1 MG/ML
0.2 INJECTION, SOLUTION INTRAMUSCULAR; INTRAVENOUS; SUBCUTANEOUS
Status: ACTIVE | OUTPATIENT
Start: 2025-06-06 | End: 2025-06-07

## 2025-06-06 RX ORDER — HYDRALAZINE HYDROCHLORIDE 20 MG/ML
5 INJECTION INTRAMUSCULAR; INTRAVENOUS
Status: DISCONTINUED | OUTPATIENT
Start: 2025-06-06 | End: 2025-06-06

## 2025-06-06 RX ORDER — DOCUSATE SODIUM 100 MG/1
100 CAPSULE, LIQUID FILLED ORAL 2 TIMES DAILY PRN
Status: DISCONTINUED | OUTPATIENT
Start: 2025-06-06 | End: 2025-06-09 | Stop reason: HOSPADM

## 2025-06-06 RX ORDER — SIMETHICONE 125 MG
125 TABLET,CHEWABLE ORAL 4 TIMES DAILY PRN
Status: DISCONTINUED | OUTPATIENT
Start: 2025-06-06 | End: 2025-06-09 | Stop reason: HOSPADM

## 2025-06-06 RX ORDER — HYDROMORPHONE HYDROCHLORIDE 1 MG/ML
0.4 INJECTION, SOLUTION INTRAMUSCULAR; INTRAVENOUS; SUBCUTANEOUS
Status: DISCONTINUED | OUTPATIENT
Start: 2025-06-06 | End: 2025-06-06

## 2025-06-06 RX ORDER — IBUPROFEN 800 MG/1
800 TABLET, FILM COATED ORAL EVERY 8 HOURS
Status: DISCONTINUED | OUTPATIENT
Start: 2025-06-07 | End: 2025-06-09 | Stop reason: HOSPADM

## 2025-06-06 RX ORDER — LABETALOL HYDROCHLORIDE 5 MG/ML
5 INJECTION, SOLUTION INTRAVENOUS
Status: DISCONTINUED | OUTPATIENT
Start: 2025-06-06 | End: 2025-06-06

## 2025-06-06 RX ORDER — VITAMIN A ACETATE, BETA CAROTENE, ASCORBIC ACID, CHOLECALCIFEROL, .ALPHA.-TOCOPHEROL ACETATE, DL-, THIAMINE MONONITRATE, RIBOFLAVIN, NIACINAMIDE, PYRIDOXINE HYDROCHLORIDE, FOLIC ACID, CYANOCOBALAMIN, CALCIUM CARBONATE, FERROUS FUMARATE, ZINC OXIDE, CUPRIC OXIDE 3080; 12; 120; 400; 1; 1.84; 3; 20; 22; 920; 25; 200; 27; 10; 2 [IU]/1; UG/1; MG/1; [IU]/1; MG/1; MG/1; MG/1; MG/1; MG/1; [IU]/1; MG/1; MG/1; MG/1; MG/1; MG/1
1 TABLET, FILM COATED ORAL
Status: DISCONTINUED | OUTPATIENT
Start: 2025-06-06 | End: 2025-06-09 | Stop reason: HOSPADM

## 2025-06-06 RX ORDER — ACETAMINOPHEN 500 MG
1000 TABLET ORAL ONCE
Status: ACTIVE | OUTPATIENT
Start: 2025-06-06 | End: 2025-06-07

## 2025-06-06 RX ORDER — EPHEDRINE SULFATE 50 MG/ML
5 INJECTION, SOLUTION INTRAVENOUS
Status: DISCONTINUED | OUTPATIENT
Start: 2025-06-06 | End: 2025-06-06

## 2025-06-06 RX ORDER — ONDANSETRON 2 MG/ML
4 INJECTION INTRAMUSCULAR; INTRAVENOUS EVERY 6 HOURS PRN
Status: DISCONTINUED | OUTPATIENT
Start: 2025-06-07 | End: 2025-06-09 | Stop reason: HOSPADM

## 2025-06-06 RX ORDER — IBUPROFEN 800 MG/1
800 TABLET, FILM COATED ORAL EVERY 8 HOURS PRN
Status: DISCONTINUED | OUTPATIENT
Start: 2025-06-09 | End: 2025-06-09 | Stop reason: HOSPADM

## 2025-06-06 RX ORDER — OXYCODONE HYDROCHLORIDE 10 MG/1
10 TABLET ORAL EVERY 4 HOURS PRN
Status: DISCONTINUED | OUTPATIENT
Start: 2025-06-07 | End: 2025-06-09 | Stop reason: HOSPADM

## 2025-06-06 RX ORDER — KETOROLAC TROMETHAMINE 15 MG/ML
15 INJECTION, SOLUTION INTRAMUSCULAR; INTRAVENOUS EVERY 6 HOURS
Status: COMPLETED | OUTPATIENT
Start: 2025-06-06 | End: 2025-06-07

## 2025-06-06 RX ORDER — DIPHENHYDRAMINE HYDROCHLORIDE 50 MG/ML
12.5 INJECTION, SOLUTION INTRAMUSCULAR; INTRAVENOUS EVERY 6 HOURS PRN
Status: ACTIVE | OUTPATIENT
Start: 2025-06-06 | End: 2025-06-07

## 2025-06-06 RX ORDER — NIFEDIPINE 10 MG/1
10 CAPSULE ORAL
Status: DISCONTINUED | OUTPATIENT
Start: 2025-06-06 | End: 2025-06-08

## 2025-06-06 RX ORDER — SODIUM CHLORIDE, SODIUM LACTATE, POTASSIUM CHLORIDE, CALCIUM CHLORIDE 600; 310; 30; 20 MG/100ML; MG/100ML; MG/100ML; MG/100ML
INJECTION, SOLUTION INTRAVENOUS CONTINUOUS
Status: DISCONTINUED | OUTPATIENT
Start: 2025-06-06 | End: 2025-06-08

## 2025-06-06 RX ORDER — MAGNESIUM SULFATE HEPTAHYDRATE 40 MG/ML
2 INJECTION, SOLUTION INTRAVENOUS ONCE
Status: ACTIVE | OUTPATIENT
Start: 2025-06-06 | End: 2025-06-07

## 2025-06-06 RX ORDER — OXYCODONE HCL 5 MG/5 ML
5 SOLUTION, ORAL ORAL
Status: DISCONTINUED | OUTPATIENT
Start: 2025-06-06 | End: 2025-06-06

## 2025-06-06 RX ORDER — SODIUM CHLORIDE, SODIUM LACTATE, POTASSIUM CHLORIDE, CALCIUM CHLORIDE 600; 310; 30; 20 MG/100ML; MG/100ML; MG/100ML; MG/100ML
INJECTION, SOLUTION INTRAVENOUS CONTINUOUS
Status: DISCONTINUED | OUTPATIENT
Start: 2025-06-06 | End: 2025-06-06

## 2025-06-06 RX ORDER — BISACODYL 10 MG
10 SUPPOSITORY, RECTAL RECTAL PRN
Status: DISCONTINUED | OUTPATIENT
Start: 2025-06-06 | End: 2025-06-09 | Stop reason: HOSPADM

## 2025-06-06 RX ORDER — SODIUM CHLORIDE, SODIUM LACTATE, POTASSIUM CHLORIDE, CALCIUM CHLORIDE 600; 310; 30; 20 MG/100ML; MG/100ML; MG/100ML; MG/100ML
2000 INJECTION, SOLUTION INTRAVENOUS PRN
Status: DISCONTINUED | OUTPATIENT
Start: 2025-06-06 | End: 2025-06-06

## 2025-06-06 RX ORDER — ALBUTEROL SULFATE 5 MG/ML
2.5 SOLUTION RESPIRATORY (INHALATION)
Status: DISCONTINUED | OUTPATIENT
Start: 2025-06-06 | End: 2025-06-06

## 2025-06-06 RX ORDER — OXYCODONE HYDROCHLORIDE 10 MG/1
10 TABLET ORAL EVERY 4 HOURS PRN
Status: ACTIVE | OUTPATIENT
Start: 2025-06-06 | End: 2025-06-07

## 2025-06-06 RX ORDER — DIPHENHYDRAMINE HCL 25 MG
25 TABLET ORAL EVERY 6 HOURS PRN
Status: DISCONTINUED | OUTPATIENT
Start: 2025-06-07 | End: 2025-06-09 | Stop reason: HOSPADM

## 2025-06-06 RX ORDER — OXYCODONE HYDROCHLORIDE 5 MG/1
5 TABLET ORAL EVERY 4 HOURS PRN
Status: DISCONTINUED | OUTPATIENT
Start: 2025-06-07 | End: 2025-06-09 | Stop reason: HOSPADM

## 2025-06-06 RX ORDER — CALCIUM GLUCONATE 98 MG/ML
1 INJECTION, SOLUTION INTRAVENOUS
Status: DISCONTINUED | OUTPATIENT
Start: 2025-06-06 | End: 2025-06-09 | Stop reason: HOSPADM

## 2025-06-06 RX ORDER — ACETAMINOPHEN 500 MG
1000 TABLET ORAL EVERY 6 HOURS PRN
Status: DISCONTINUED | OUTPATIENT
Start: 2025-06-10 | End: 2025-06-09 | Stop reason: HOSPADM

## 2025-06-06 RX ORDER — OXYCODONE HCL 5 MG/5 ML
10 SOLUTION, ORAL ORAL
Status: DISCONTINUED | OUTPATIENT
Start: 2025-06-06 | End: 2025-06-06

## 2025-06-06 RX ORDER — SODIUM CHLORIDE, SODIUM LACTATE, POTASSIUM CHLORIDE, CALCIUM CHLORIDE 600; 310; 30; 20 MG/100ML; MG/100ML; MG/100ML; MG/100ML
2000 INJECTION, SOLUTION INTRAVENOUS PRN
Status: DISCONTINUED | OUTPATIENT
Start: 2025-06-06 | End: 2025-06-08

## 2025-06-06 RX ORDER — MORPHINE SULFATE 0.5 MG/ML
INJECTION, SOLUTION EPIDURAL; INTRATHECAL; INTRAVENOUS PRN
Status: DISCONTINUED | OUTPATIENT
Start: 2025-06-06 | End: 2025-06-06 | Stop reason: SURG

## 2025-06-06 RX ORDER — OXYCODONE HYDROCHLORIDE 5 MG/1
5 TABLET ORAL EVERY 4 HOURS PRN
Status: ACTIVE | OUTPATIENT
Start: 2025-06-06 | End: 2025-06-07

## 2025-06-06 RX ORDER — DIPHENHYDRAMINE HYDROCHLORIDE 50 MG/ML
12.5 INJECTION, SOLUTION INTRAMUSCULAR; INTRAVENOUS
Status: DISCONTINUED | OUTPATIENT
Start: 2025-06-06 | End: 2025-06-06

## 2025-06-06 RX ORDER — HYDROMORPHONE HYDROCHLORIDE 1 MG/ML
0.1 INJECTION, SOLUTION INTRAMUSCULAR; INTRAVENOUS; SUBCUTANEOUS
Status: DISCONTINUED | OUTPATIENT
Start: 2025-06-06 | End: 2025-06-06

## 2025-06-06 RX ORDER — DIPHENHYDRAMINE HYDROCHLORIDE 50 MG/ML
25 INJECTION, SOLUTION INTRAMUSCULAR; INTRAVENOUS EVERY 6 HOURS PRN
Status: DISCONTINUED | OUTPATIENT
Start: 2025-06-07 | End: 2025-06-09 | Stop reason: HOSPADM

## 2025-06-06 RX ORDER — ACETAMINOPHEN 500 MG
1000 TABLET ORAL EVERY 6 HOURS
Status: DISCONTINUED | OUTPATIENT
Start: 2025-06-07 | End: 2025-06-09 | Stop reason: HOSPADM

## 2025-06-06 RX ORDER — MAGNESIUM SULFATE HEPTAHYDRATE 40 MG/ML
2 INJECTION, SOLUTION INTRAVENOUS CONTINUOUS
Status: DISCONTINUED | OUTPATIENT
Start: 2025-06-06 | End: 2025-06-09 | Stop reason: HOSPADM

## 2025-06-06 RX ORDER — HYDROMORPHONE HYDROCHLORIDE 1 MG/ML
0.2 INJECTION, SOLUTION INTRAMUSCULAR; INTRAVENOUS; SUBCUTANEOUS
Status: DISCONTINUED | OUTPATIENT
Start: 2025-06-06 | End: 2025-06-06

## 2025-06-06 RX ORDER — ONDANSETRON 4 MG/1
4 TABLET, ORALLY DISINTEGRATING ORAL EVERY 6 HOURS PRN
Status: DISCONTINUED | OUTPATIENT
Start: 2025-06-07 | End: 2025-06-09 | Stop reason: HOSPADM

## 2025-06-06 RX ORDER — HALOPERIDOL 5 MG/ML
1 INJECTION INTRAMUSCULAR
Status: DISCONTINUED | OUTPATIENT
Start: 2025-06-06 | End: 2025-06-06

## 2025-06-06 RX ORDER — ONDANSETRON 2 MG/ML
4 INJECTION INTRAMUSCULAR; INTRAVENOUS EVERY 6 HOURS PRN
Status: ACTIVE | OUTPATIENT
Start: 2025-06-06 | End: 2025-06-07

## 2025-06-06 RX ORDER — KETOROLAC TROMETHAMINE 15 MG/ML
INJECTION, SOLUTION INTRAMUSCULAR; INTRAVENOUS PRN
Status: DISCONTINUED | OUTPATIENT
Start: 2025-06-06 | End: 2025-06-06 | Stop reason: SURG

## 2025-06-06 RX ORDER — EPHEDRINE SULFATE 50 MG/ML
10 INJECTION, SOLUTION INTRAVENOUS
Status: ACTIVE | OUTPATIENT
Start: 2025-06-06 | End: 2025-06-07

## 2025-06-06 RX ADMIN — KETOROLAC TROMETHAMINE 15 MG: 15 INJECTION, SOLUTION INTRAMUSCULAR; INTRAVENOUS at 00:12

## 2025-06-06 RX ADMIN — ACETAMINOPHEN 1000 MG: 500 TABLET ORAL at 19:33

## 2025-06-06 RX ADMIN — KETOROLAC TROMETHAMINE 15 MG: 15 INJECTION, SOLUTION INTRAMUSCULAR; INTRAVENOUS at 06:03

## 2025-06-06 RX ADMIN — MORPHINE SULFATE 2 MG: 0.5 INJECTION, SOLUTION EPIDURAL; INTRATHECAL; INTRAVENOUS at 00:05

## 2025-06-06 RX ADMIN — ACETAMINOPHEN 1000 MG: 500 TABLET ORAL at 12:39

## 2025-06-06 RX ADMIN — DOCUSATE SODIUM 100 MG: 100 CAPSULE, LIQUID FILLED ORAL at 21:48

## 2025-06-06 RX ADMIN — PRENATAL WITH FERROUS FUM AND FOLIC ACID 1 TABLET: 3080; 920; 120; 400; 22; 1.84; 3; 20; 10; 1; 12; 200; 27; 25; 2 TABLET ORAL at 08:15

## 2025-06-06 RX ADMIN — OXYTOCIN 125 ML/HR: 10 INJECTION, SOLUTION INTRAMUSCULAR; INTRAVENOUS at 00:30

## 2025-06-06 RX ADMIN — SODIUM CHLORIDE, POTASSIUM CHLORIDE, SODIUM LACTATE AND CALCIUM CHLORIDE: 600; 310; 30; 20 INJECTION, SOLUTION INTRAVENOUS at 06:51

## 2025-06-06 RX ADMIN — ACETAMINOPHEN 1000 MG: 500 TABLET ORAL at 06:04

## 2025-06-06 RX ADMIN — ACETAMINOPHEN 1000 MG: 500 TABLET ORAL at 00:36

## 2025-06-06 RX ADMIN — MAGNESIUM SULFATE IN WATER 2 G/HR: 40 INJECTION, SOLUTION INTRAVENOUS at 08:28

## 2025-06-06 RX ADMIN — KETOROLAC TROMETHAMINE 15 MG: 15 INJECTION, SOLUTION INTRAMUSCULAR; INTRAVENOUS at 12:38

## 2025-06-06 RX ADMIN — OXYTOCIN 75 ML/HR: 10 INJECTION, SOLUTION INTRAMUSCULAR; INTRAVENOUS at 04:28

## 2025-06-06 RX ADMIN — KETOROLAC TROMETHAMINE 15 MG: 15 INJECTION, SOLUTION INTRAMUSCULAR; INTRAVENOUS at 19:33

## 2025-06-06 ASSESSMENT — PAIN DESCRIPTION - PAIN TYPE
TYPE: ACUTE PAIN;SURGICAL PAIN
TYPE: ACUTE PAIN
TYPE: ACUTE PAIN;SURGICAL PAIN

## 2025-06-06 ASSESSMENT — PAIN SCALES - GENERAL: PAIN_LEVEL: 0

## 2025-06-06 NOTE — LACTATION NOTE
This note was copied from a baby's chart.  Initial Consult:     History:   Pc/s at 38w+1d.  Maternal hx of CHTN, Pre-E.  MgSO4 gtt infusing.  Baby recently had  echo for ASD/VSD.  Will follow up outpatient.     Breastfeeding Assistance: Provided breastfeeding assistance with: positioning of baby at the left breast in the football position.  MOB was taught to place baby tummy to tummy and nipple to nose, wedging of the breast, and how to achieve deep latch.  Demonstrated and taught MOB how to perform hand expression, MOB able to hand express colostrum independently. Infant latched deep onto breast and suckled with nutritive suck after multiple attempts.  MOB denied pain with latch at breast.     Provided breastfeeding education on: hunger cues, frequency/duration of breastfeeds, skin to skin, cluster feeding, shallow vs deep latch, and nutritive vs non-nutritive suck.    Breastfeeding Plan:      First 24hrs:   Encourage breastfeeding on demand based on early feeding cues at least 8x in 24hrs.  If baby is sleepy, unswaddle and place baby skin to skin if last feed was 3hrs prior.  If unable to latch, hand express colostrum near baby's mouth to entice feeding.  If no latch is achieved, hand express colostrum into spoon or cup and feed to baby and place skin to skin.  Ask bedside RN if assistance is needed. Around 24hrs of life, baby may begin to become more engaged in breastfeeding.     After 24hrs:   Anticipate clusterfeeding and baby may feed more frequently and for longer periods of time.  This frequent feeding is activating hormones to make mature breastmilk.  Breastmilk should increase in volume by postpartum day 4.  Signs and symptoms of engorgement might happen.     .

## 2025-06-06 NOTE — PROGRESS NOTES
0800- Assessment completed. Fundus firm, lochia light. Abd. incisions with mepilex dressing intact. Plan of care reviewed. Patient ambulated with CNA and this RN to the restroom at this time and tolerated well. Denies pain at this time, will call if pain med intervention needed. Call light within reach, bed at lowest position, and shows no signs of distress at this time.    1208- Notified Dr. Torres regarding patient's BP's. Per Dr. Torres no new orders, but to notify MD if BPs are 150s/100s.

## 2025-06-06 NOTE — CARE PLAN
The patient is Stable - Low risk of patient condition declining or worsening    Shift Goals  Clinical Goals: Pain control, Vitals signs WDL, Lochia WDL  Patient Goals:   Family Goals:     Progress made toward(s) clinical / shift goals:    Problem: Early Mobilization - Post Surgery  Goal: Early mobilization post surgery  Outcome: Progressing  Note: Patient ambulates with a steady gait and tolerates well through out this shift.     Problem: Altered Physiologic Condition  Goal: Patient physiologically stable as evidenced by normal lochia, palpable uterine involution and vitals within normal limits  Outcome: Progressing  Note: Patient's fundus firm, two below umbilicus with light bleeding through out this shift.

## 2025-06-06 NOTE — PROGRESS NOTES
1900: report received from MARIA M Cartagena. Care assumed, pt actively pushing at this time, this RN remains at bs    2040: Dr. Sims at bs for assessment of pushing, ant lip noted, reduced with pushing by MD. Encourages to continue pushing at this time, pt agreeable at this time.     2131: Dr. Sims called to bs for eval of pushing progress    2140: Dr. Sims at bs, minimal progress made with maximum effort of maternal pushing. Pt requesting to end pushing at this time, C/S called for arrest of descent. This RN to prep for C/S.     2310: Pt transferred to OR via labor bed    2313: in OR 2    2341: Delivery of viable infant female, APGARS 8/9. See delivery note and summary for details    0015: Pt transferred from OR2 in stable condition via gurney to PACU 2. Anesthesia and this RN at bs, handoff report received from anesthesia MD.     0420: pt transferred to postpartum via rShunk in stable condition by this RN. Infant in arms. Bs report given to MARIA M Ribeiro, POC discussed. Bands verified, care relinquished.

## 2025-06-06 NOTE — ANESTHESIA POSTPROCEDURE EVALUATION
Patient: Mayra Vieira    Procedure Summary       Date: 25 Room / Location: [facility] OR 02 / SURGERY LABOR AND DELIVERY    Anesthesia Start: 240 Anesthesia Stop: 25    Procedure:  SECTION, PRIMARY (Abdomen) Diagnosis:        delivery delivered      (same del)    Surgeons: Saritha Sims M.D. Responsible Provider: Staci Palacios M.D.    Anesthesia Type: epidural ASA Status: 2            Final Anesthesia Type: epidural  Last vitals  BP   Blood Pressure: (!) 155/89    Temp   36.3 °C (97.4 °F)    Pulse   98   Resp   18    SpO2   96 %      Anesthesia Post Evaluation    Patient location during evaluation: PACU  Patient participation: complete - patient participated  Level of consciousness: awake and alert  Pain score: 0    Airway patency: patent  Anesthetic complications: no  Cardiovascular status: hemodynamically stable  Respiratory status: acceptable  Hydration status: euvolemic    PONV: none    patient able to participate, but full recovery from regional anesthesia has not occurred and is not expected within the stipulated timeframe for the completion of the evaluation      No notable events documented.

## 2025-06-06 NOTE — ANESTHESIA TIME REPORT
Anesthesia Start and Stop Event Times       Date Time Event    6/5/2025 0240 Ready for Procedure     0240 Anesthesia Start    6/6/2025 0025 Anesthesia Stop          Responsible Staff  06/05/25 to 06/06/25      Name Role Begin End    Yoni Clark MD, PhD Anesth 0240 0705    Staci Palacios M.D. Anesth 0705 0025          Overtime Reason:  no overtime (within assigned shift)    Comments:

## 2025-06-06 NOTE — PROGRESS NOTES
Labor Progress Note    Mayra Vieira   38w1d      Subjective:  Uterine contractions:yes  Pain: Yes. Severity: mild    Objective:   Vitals:    25 1601 25 1637 25 1700 25 1800   BP:  (!) 157/77 (!) 148/76 (!) 155/89   Pulse:  94 93 98   Resp:       Temp: 36.3 °C (97.4 °F) 36.3 °C (97.4 °F)     TempSrc: Temporal Oral     SpO2: 96%      Weight:       Height:         Fetal heart variability: moderate  Fetal Heart Rate decelerations: none  Fetal Heart Rate accelerations: yes  Baseline FHR: 150 per minute  Uterine contractions: regular, every 3 minutes  Cervical: 100% ;  Dilatation .10 ; Station negative0    Membranes ruptured: .yes    Meds:   Epidural : .yes  Pitocin: .yes    Labs:  Recent Results (from the past 24 hours)   SERUM MAGNESIUM LEVELS EVERY 6 HRS UNTIL DESIRED RANGE (5-8 MG/DL) ACHIEVED    Collection Time: 25  4:24 AM   Result Value Ref Range    Magnesium 4.7 (H) 1.5 - 2.5 mg/dL   SERUM MAGNESIUM LEVELS EVERY 6 HRS UNTIL DESIRED RANGE (5-8 MG/DL) ACHIEVED    Collection Time: 25 10:58 AM   Result Value Ref Range    Magnesium 4.8 (H) 1.5 - 2.5 mg/dL   SERUM MAGNESIUM LEVELS EVERY 6 HRS UNTIL DESIRED RANGE (5-8 MG/DL) ACHIEVED    Collection Time: 25  4:10 PM   Result Value Ref Range    Magnesium 4.9 (H) 1.5 - 2.5 mg/dL   COD - Adult (Type and Screen)    Collection Time: 25  4:10 PM   Result Value Ref Range    ABO Grouping Only A     Rh Grouping Only POS     Antibody Screen-Cod NEG    SERUM MAGNESIUM LEVELS EVERY 6 HRS UNTIL DESIRED RANGE (5-8 MG/DL) ACHIEVED    Collection Time: 25  9:57 PM   Result Value Ref Range    Magnesium 6.1 (H) 1.5 - 2.5 mg/dL       Assessment:   38w1d  Labor State: Arrest of progress - second stage labor.    Discussed with patient there is been no descent of the fetal head in almost 4 hours of pushing.  At this point I would recommend proceeding with primary  delivery.  We discussed risks benefits and indication patient agrees  to proceed, informed consent is signed    Discussed with the patient indications for  delivery. The patient voiced understanding of indications for  section at this time.    Discussed with the patient the risks of  delivery. The risks include infection, bleeding, scarring, damage to other organs in the area of operation. Specifically organs that can be damaged are bowel, bladder, ureters. I also discussed with the patient the risk of wound infection and wound breakdown. We discussed that these risks are greater in people that have diabetes or obesity. I also discussed the risk of emergency blood transfusion during procedure as well as emergency hysterectomy during procedure.        Patient had the opportunity to ask questions regarding procedures. All questions answered to the patient's satisfaction.  Proceed with  delivery       Saritha Sims M.D.

## 2025-06-06 NOTE — OP REPORT
Operative procedure note    Procedure performed  1. Primary          Preoperative diagnosis  1. GHTN  2. IUP term  3. Arrest of descent    Postoperative diagnosis  Same    Surgeon: Saritha Sims M.D.  Assistant: MD Joe    Anesthesiologist: MD Philip  Anesthesia: epidural    Estimated blood loss: 500 cc    female presentation Vertex OP APGAR 8/9  Uterus Normal, Tubes Normal, ovaries Normal       Procedure in detail  After informed consent had been obtained the patient was seen to the operating room where anesthesia was found to be adequate.  The present was prepped and draped in the usual sterile fashion, a timeout was performed.  Pfannenstiel skin incision was made and carried down to the underlying layer of fascia with the knife.  The fascia was then nicked in the midline elevated and the fascial incision extended laterally on both sides with Almanza scissors.  The rectus muscles were then dissected off the posterior aspect of the fascia both superiorly and inferiorly.  They were  in the midline the peritoneum was identified and entered sharply with Metzenbaum scissors.  The peritoneal incision was then extended superiorly and inferiorly with good visualization of bowel and bladder.  The vesicular uterine peritoneum was identified and a bladder flap created.  The uterus was incised in a low transverse fashion and extended bluntly.  The infant was delivered through the incision without complication, after 30 seconds of delayed cord clamping the cord was clamped x2 and cut.  The infant was then handed to awaiting nursing staff.  The placenta then delivered spontaneously.  The uterus was cleared of all clots and debris with a moist lap sponge.  The uterine incision closed with a #1 chromic in a running locked fashion.   Hemostasis was found to be adequate at the hysterotomy.  The abdomen was irrigated cleared of all clots and debris with a moist lap sponge.  The Huey O retractor was removed.   The peritoneum was identified and loosely closed with 0 Vicryl.  The fascia was then closed with 0 Vicryl in a running fashion.  Subcutaneous layers were checked for hemostasis which was noted to be adequate reapproximated with a 2-0 plain.  The skin was closed in a subcuticular fashion with a 4-0 Vicryl.  Sponge lap needle counts were all correct x2 and the patient is taken to the recovery room in good condition.

## 2025-06-06 NOTE — PROGRESS NOTES
Bedside report received from Paula FRANZ. Assumed care of  pt. Pt on continuous pulse ox. Mag and pitocin infusing a rate per MAR.  Pt asymptomatic. No HA, dizziness or abdomen pain. Pt resting comfortably in bed. FOB at bedside. Infant in open crib. A/Ox4, VSS, and assessed pain. All needs met. POC reviewed and white board updated. Call light in reach. Bed locked in lowest position with 2 upper bed rails up. Cuddles and bands verified with second RN

## 2025-06-06 NOTE — PROGRESS NOTES
Labor Progress Note    Mayra Vieira   38w1d      Subjective:  Uterine contractions:yes  Pain: No    Objective:   Vitals:    06/05/25 1601 06/05/25 1637 06/05/25 1700 06/05/25 1800   BP:  (!) 157/77 (!) 148/76 (!) 155/89   Pulse:  94 93 98   Resp:       Temp: 36.3 °C (97.4 °F) 36.3 °C (97.4 °F)     TempSrc: Temporal Oral     SpO2: 96%      Weight:       Height:         Fetal heart variability: moderate, accelerations, reactive category 1  Cervical: 100%;  Dilatation .AL reduced to complete ; Station negative0    Membranes ruptured: .yes    Meds:   Epidural : .yes  Pitocin: .yes    Labs:  Recent Results (from the past 24 hours)   SERUM MAGNESIUM LEVELS EVERY 6 HRS UNTIL DESIRED RANGE (5-8 MG/DL) ACHIEVED    Collection Time: 06/05/25  4:24 AM   Result Value Ref Range    Magnesium 4.7 (H) 1.5 - 2.5 mg/dL   SERUM MAGNESIUM LEVELS EVERY 6 HRS UNTIL DESIRED RANGE (5-8 MG/DL) ACHIEVED    Collection Time: 06/05/25 10:58 AM   Result Value Ref Range    Magnesium 4.8 (H) 1.5 - 2.5 mg/dL   SERUM MAGNESIUM LEVELS EVERY 6 HRS UNTIL DESIRED RANGE (5-8 MG/DL) ACHIEVED    Collection Time: 06/05/25  4:10 PM   Result Value Ref Range    Magnesium 4.9 (H) 1.5 - 2.5 mg/dL   COD - Adult (Type and Screen)    Collection Time: 06/05/25  4:10 PM   Result Value Ref Range    ABO Grouping Only A     Rh Grouping Only POS     Antibody Screen-Cod NEG        Assessment:   38w1d  Labor State: Active phase labor.  Patient has now been pushing for 2-1/2 hours, baby still at a 0 station with minimal descent of fetal head, patient with excellent expulsive efforts.  Originally when she was examined she was noted to still be an anterior lip.  The cervix was reduced and she will continue pushing at this time.  Fetal status is reassuring  We discussed upper limits of time for pushing will be between 3 and 4 hours, anticipate point patient would like to continue pushing.      Saritha Sims M.D.

## 2025-06-07 LAB
ALBUMIN SERPL BCP-MCNC: 3 G/DL (ref 3.2–4.9)
ALBUMIN/GLOB SERPL: 0.9 G/DL
ALP SERPL-CCNC: 165 U/L (ref 30–99)
ALT SERPL-CCNC: 32 U/L (ref 2–50)
ANION GAP SERPL CALC-SCNC: 11 MMOL/L (ref 7–16)
AST SERPL-CCNC: 29 U/L (ref 12–45)
BASOPHILS # BLD AUTO: 0.2 % (ref 0–1.8)
BASOPHILS # BLD: 0.03 K/UL (ref 0–0.12)
BILIRUB SERPL-MCNC: <0.2 MG/DL (ref 0.1–1.5)
BUN SERPL-MCNC: 11 MG/DL (ref 8–22)
CALCIUM ALBUM COR SERPL-MCNC: 9.4 MG/DL (ref 8.5–10.5)
CALCIUM SERPL-MCNC: 8.6 MG/DL (ref 8.5–10.5)
CHLORIDE SERPL-SCNC: 107 MMOL/L (ref 96–112)
CO2 SERPL-SCNC: 19 MMOL/L (ref 20–33)
CREAT SERPL-MCNC: 0.61 MG/DL (ref 0.5–1.4)
EOSINOPHIL # BLD AUTO: 0.07 K/UL (ref 0–0.51)
EOSINOPHIL NFR BLD: 0.5 % (ref 0–6.9)
ERYTHROCYTE [DISTWIDTH] IN BLOOD BY AUTOMATED COUNT: 43.9 FL (ref 35.9–50)
ERYTHROCYTE [DISTWIDTH] IN BLOOD BY AUTOMATED COUNT: 45.5 FL (ref 35.9–50)
GFR SERPLBLD CREATININE-BSD FMLA CKD-EPI: 119 ML/MIN/1.73 M 2
GLOBULIN SER CALC-MCNC: 3.2 G/DL (ref 1.9–3.5)
GLUCOSE SERPL-MCNC: 100 MG/DL (ref 65–99)
HCT VFR BLD AUTO: 31.9 % (ref 37–47)
HCT VFR BLD AUTO: 32.7 % (ref 37–47)
HGB BLD-MCNC: 10.7 G/DL (ref 12–16)
HGB BLD-MCNC: 11.3 G/DL (ref 12–16)
IMM GRANULOCYTES # BLD AUTO: 0.09 K/UL (ref 0–0.11)
IMM GRANULOCYTES NFR BLD AUTO: 0.6 % (ref 0–0.9)
LYMPHOCYTES # BLD AUTO: 2.71 K/UL (ref 1–4.8)
LYMPHOCYTES NFR BLD: 17.6 % (ref 22–41)
MCH RBC QN AUTO: 31.6 PG (ref 27–33)
MCH RBC QN AUTO: 31.7 PG (ref 27–33)
MCHC RBC AUTO-ENTMCNC: 33.5 G/DL (ref 32.2–35.5)
MCHC RBC AUTO-ENTMCNC: 34.6 G/DL (ref 32.2–35.5)
MCV RBC AUTO: 91.6 FL (ref 81.4–97.8)
MCV RBC AUTO: 94.1 FL (ref 81.4–97.8)
MONOCYTES # BLD AUTO: 1.34 K/UL (ref 0–0.85)
MONOCYTES NFR BLD AUTO: 8.7 % (ref 0–13.4)
NEUTROPHILS # BLD AUTO: 11.2 K/UL (ref 1.82–7.42)
NEUTROPHILS NFR BLD: 72.4 % (ref 44–72)
NRBC # BLD AUTO: 0 K/UL
NRBC BLD-RTO: 0 /100 WBC (ref 0–0.2)
PLATELET # BLD AUTO: 251 K/UL (ref 164–446)
PLATELET # BLD AUTO: 298 K/UL (ref 164–446)
PMV BLD AUTO: 10.2 FL (ref 9–12.9)
PMV BLD AUTO: 10.8 FL (ref 9–12.9)
POTASSIUM SERPL-SCNC: 4.4 MMOL/L (ref 3.6–5.5)
PROT SERPL-MCNC: 6.2 G/DL (ref 6–8.2)
RBC # BLD AUTO: 3.39 M/UL (ref 4.2–5.4)
RBC # BLD AUTO: 3.57 M/UL (ref 4.2–5.4)
SODIUM SERPL-SCNC: 137 MMOL/L (ref 135–145)
WBC # BLD AUTO: 15.4 K/UL (ref 4.8–10.8)
WBC # BLD AUTO: 17.2 K/UL (ref 4.8–10.8)

## 2025-06-07 PROCEDURE — A9270 NON-COVERED ITEM OR SERVICE: HCPCS | Performed by: ANESTHESIOLOGY

## 2025-06-07 PROCEDURE — 700102 HCHG RX REV CODE 250 W/ 637 OVERRIDE(OP): Performed by: ANESTHESIOLOGY

## 2025-06-07 PROCEDURE — 700102 HCHG RX REV CODE 250 W/ 637 OVERRIDE(OP): Performed by: OBSTETRICS & GYNECOLOGY

## 2025-06-07 PROCEDURE — A9270 NON-COVERED ITEM OR SERVICE: HCPCS | Performed by: OBSTETRICS & GYNECOLOGY

## 2025-06-07 PROCEDURE — 85025 COMPLETE CBC W/AUTO DIFF WBC: CPT

## 2025-06-07 PROCEDURE — 80053 COMPREHEN METABOLIC PANEL: CPT

## 2025-06-07 PROCEDURE — 770002 HCHG ROOM/CARE - OB PRIVATE (112)

## 2025-06-07 PROCEDURE — 700111 HCHG RX REV CODE 636 W/ 250 OVERRIDE (IP): Mod: JZ | Performed by: ANESTHESIOLOGY

## 2025-06-07 PROCEDURE — 85027 COMPLETE CBC AUTOMATED: CPT

## 2025-06-07 PROCEDURE — 36415 COLL VENOUS BLD VENIPUNCTURE: CPT

## 2025-06-07 RX ORDER — NIFEDIPINE 10 MG/1
10 CAPSULE ORAL ONCE
Status: COMPLETED | OUTPATIENT
Start: 2025-06-07 | End: 2025-06-07

## 2025-06-07 RX ADMIN — IBUPROFEN 800 MG: 800 TABLET, FILM COATED ORAL at 21:47

## 2025-06-07 RX ADMIN — ACETAMINOPHEN 1000 MG: 500 TABLET ORAL at 18:46

## 2025-06-07 RX ADMIN — IBUPROFEN 800 MG: 800 TABLET, FILM COATED ORAL at 14:27

## 2025-06-07 RX ADMIN — ACETAMINOPHEN 1000 MG: 500 TABLET ORAL at 05:23

## 2025-06-07 RX ADMIN — NIFEDIPINE 10 MG: 10 CAPSULE ORAL at 22:49

## 2025-06-07 RX ADMIN — PRENATAL WITH FERROUS FUM AND FOLIC ACID 1 TABLET: 3080; 920; 120; 400; 22; 1.84; 3; 20; 10; 1; 12; 200; 27; 25; 2 TABLET ORAL at 08:10

## 2025-06-07 RX ADMIN — IBUPROFEN 800 MG: 800 TABLET, FILM COATED ORAL at 05:23

## 2025-06-07 RX ADMIN — ACETAMINOPHEN 1000 MG: 500 TABLET ORAL at 12:20

## 2025-06-07 RX ADMIN — ACETAMINOPHEN 1000 MG: 500 TABLET ORAL at 23:49

## 2025-06-07 RX ADMIN — KETOROLAC TROMETHAMINE 15 MG: 15 INJECTION, SOLUTION INTRAMUSCULAR; INTRAVENOUS at 00:06

## 2025-06-07 RX ADMIN — ACETAMINOPHEN 1000 MG: 500 TABLET ORAL at 00:07

## 2025-06-07 ASSESSMENT — EDINBURGH POSTNATAL DEPRESSION SCALE (EPDS)
I HAVE LOOKED FORWARD WITH ENJOYMENT TO THINGS: AS MUCH AS I EVER DID
THINGS HAVE BEEN GETTING ON TOP OF ME: NO, I HAVE BEEN COPING AS WELL AS EVER
I HAVE BEEN SO UNHAPPY THAT I HAVE BEEN CRYING: NO, NEVER
I HAVE BEEN ANXIOUS OR WORRIED FOR NO GOOD REASON: NO, NOT AT ALL
I HAVE FELT SCARED OR PANICKY FOR NO GOOD REASON: NO, NOT MUCH
I HAVE BLAMED MYSELF UNNECESSARILY WHEN THINGS WENT WRONG: NOT VERY OFTEN
I HAVE BEEN ABLE TO LAUGH AND SEE THE FUNNY SIDE OF THINGS: AS MUCH AS I ALWAYS COULD
I HAVE BEEN SO UNHAPPY THAT I HAVE HAD DIFFICULTY SLEEPING: NOT AT ALL
I HAVE FELT SAD OR MISERABLE: NO, NOT AT ALL
THE THOUGHT OF HARMING MYSELF HAS OCCURRED TO ME: NEVER

## 2025-06-07 ASSESSMENT — PAIN DESCRIPTION - PAIN TYPE
TYPE: ACUTE PAIN;SURGICAL PAIN
TYPE: ACUTE PAIN
TYPE: ACUTE PAIN;SURGICAL PAIN

## 2025-06-07 NOTE — CARE PLAN
The patient is Stable - Low risk of patient condition declining or worsening    Shift Goals  Clinical Goals: BP and pain management, mobilize more, fundus firm, lochia light, bond with infant  Patient Goals: healthy infant, rest  Family Goals: supportive care    Progress made toward(s) clinical / shift goals:      Problem: Pain - Standard  Goal: Alleviation of pain or a reduction in pain to the patient’s comfort goal  Outcome: Progressing  Note: Pt on scheduled toradol and tylenol. Reports adequate pain management. Resting and calm.      Problem: Knowledge Deficit - Postpartum  Goal: Patient will verbalize and demonstrate understanding of self and infant care  Outcome: Progressing  Note: Bonding with infant and FOB in room. Infant swaddled appropriately.      Problem: Altered Physiologic Condition  Goal: Patient physiologically stable as evidenced by normal lochia, palpable uterine involution and vitals within normal limits  Outcome: Progressing  Note: Fundus firm, lochia light. Vitals WNL.      Problem: Early Mobilization - Post Surgery  Goal: Early mobilization post surgery  Outcome: Progressing  Note: Ambulatory with steady gait.

## 2025-06-07 NOTE — PROGRESS NOTES
[place] cath dcd at 2340. Instructed to call staff on first void to assess. Ambulatory with steady gait.     Magnesium gtt discontinued. Reviewed vitals frequency. Patient refused any headaches.

## 2025-06-07 NOTE — CARE PLAN
Patient Education        Learning About Coronary Artery Disease (CAD)  What is coronary artery disease? Coronary artery disease (CAD) occurs when plaque builds up in the arteries that bring oxygen-rich blood to your heart. Plaque is a fatty substance made of cholesterol, calcium, and other substances in the blood. This process is called hardening of the arteries, or atherosclerosis. What happens when you have coronary artery disease? · Plaque may narrow the coronary arteries. Narrowed arteries cause poor blood flow. This can lead to angina symptoms such as chest pain or discomfort. If blood flow is completely blocked, you could have a heart attack. · You can slow CAD and reduce the risk of future problems by making changes in your lifestyle. These include quitting smoking and eating heart-healthy foods. · Treatments for CAD, along with changes in your lifestyle, can help you live a longer and healthier life. How can you prevent coronary artery disease? · Do not smoke. It may be the best thing you can do to prevent heart disease. If you need help quitting, talk to your doctor about stop-smoking programs and medicines. These can increase your chances of quitting for good. · Be active. Get at least 30 minutes of exercise on most days of the week. Walking is a good choice. You also may want to do other activities, such as running, swimming, cycling, or playing tennis or team sports. · Eat heart-healthy foods. Eat more fruits and vegetables and less foods that contain saturated and trans fats. Limit alcohol, sodium, and sweets. · Stay at a healthy weight. Lose weight if you need to. · Manage other health problems such as diabetes, high blood pressure, and high cholesterol. How is coronary artery disease treated? · Your doctor will suggest that you make lifestyle changes. For example, your doctor may ask you to eat healthy foods, quit smoking, lose extra weight, and be more active.   · You will have to The patient is Stable - Low risk of patient condition declining or worsening    Shift Goals  Clinical Goals: Pain control, Vitals signs WDL, Lochia WDL  Patient Goals:   Family Goals:     Progress made toward(s) clinical / shift goals:    Problem: Early Mobilization - Post Surgery  Goal: Early mobilization post surgery  Outcome: Progressing  Note: Patient ambulates with a steady gait and tolerates well through out this shift.     Problem: Altered Physiologic Condition  Goal: Patient physiologically stable as evidenced by normal lochia, palpable uterine involution and vitals within normal limits  6/7/2025 0956 by Annika Wright R.N.  Outcome: Progressing  Note: Patient's fundus firm, one below umbilicus with scant bleeding through out this shift.  6/7/2025 0901 by Annika Wright R.N.  Outcome: Progressing          take medicines. · Your doctor may suggest a procedure to open narrowed or blocked arteries. This is called angioplasty. Or your doctor may suggest using healthy blood vessels to create detours around narrowed or blocked arteries. This is called bypass surgery. Follow-up care is a key part of your treatment and safety. Be sure to make and go to all appointments, and call your doctor if you are having problems. It's also a good idea to know your test results and keep a list of the medicines you take. Where can you learn more? Go to https://[employer] See[employer]. org and sign in to your [employer] [facility] account. Enter [phone] in the Sou[employer] box to learn more about \"Learning About Coronary Artery Disease (CAD). \"     If you do not have an account, please click on the \"Sign Up Now\" link. Current as of: December 16, 2019               Content Version: 12.5  © 5366-4025 [employer]wise, Incorporated. Care instructions adapted under license by [facility] ([facility]). If you have questions about a medical condition or this instruction, always ask your healthcare professional. [place] [address] any warranty or liability for your use of this information.

## 2025-06-07 NOTE — PROGRESS NOTES
Handoff assessment: pt reports adequate pain management. Magnesium gtt infusing at 2g/hr. Bonding with infant and family in room. Able to make needs known.

## 2025-06-07 NOTE — PROGRESS NOTES
0800- Assessment completed. Fundus firm, lochia scant. Abd. incisions with mepilex silver dressing intact. Plan of care reviewed. Denies pain at this time, will call if pain med intervention needed. Call light within reach, bed at lowest position, and shows no signs of distress at this time.

## 2025-06-07 NOTE — PROGRESS NOTES
Section Postpartum Progress Note      Subjective:  The patient reports that overall she is doing well.  She states that her pain is controlled with her current pain regimen.  She is tolerating oral intake and denies nausea or vomiting.  She is ambulating without difficulty.  She is voiding spontaneously.  Her lochia is minimal.  She is working on breast-feeding.  She has no other concerns at this time.    Physical exam:  Vitals:   Vitals:    25 0530   BP: (!) 141/91   Pulse: 88   Resp: 18   Temp: 36.7 °C (98.1 °F)   SpO2: 97%   General: Alert, conversational, pleasant, no acute distress.  Cardiovascular: Regular rate.  Pulmonary: No respiratory distress, symmetric expansion.  Abdomen soft, non-tender, non-distended, fundus firm, non-tender, below the umbilicus. Incision dressing clean and dry. No surrounding erythema or drainage.   Genitourinary: Deferred  Extremities: Moves all, trace edema.     Laboratory studies:   Latest Reference Range & Units 25 23:50   WBC 4.8 - 10.8 K/uL 11.2 (H)   RBC 4.20 - 5.40 M/uL 4.02 (L)   Hemoglobin 12.0 - 16.0 g/dL 12.5   Hematocrit 37.0 - 47.0 % 37.1   MCV 81.4 - 97.8 fL 92.3   MCH 27.0 - 33.0 pg 31.1   MCHC 32.2 - 35.5 g/dL 33.7   RDW 35.9 - 50.0 fL 43.5   Platelet Count 164 - 446 K/uL 295   MPV 9.0 - 12.9 fL 11.3     Assessment and Plan:   35-year-old  1 para 1-0-0-1 status post a primary  section due to arrest of descent, postop day #1    Assessment:  Postpartum sate  Preeclampsia with severe features, status post magnesium sulfate for seizure prophylaxis, DC'd on  at 2340    Plan:  - Continue routine postpartum care.   - Monitor blood pressures and urine output.  - Anticipate discharge home on POD #3-4 due to preeclampsia with severe features.    Suni Barros MD.         Suni Barros M.D.

## 2025-06-07 NOTE — LACTATION NOTE
This note was copied from a baby's chart.  Lactation follow-up visit      Baby 38.3 weeks, , baby's weight loss 9.14% @ 24 hours old. Baby in crib with pacifier, Mother requests latch assist. LC placed baby STS, baby frantic & fussy, no latch. Educated mother on pacifier use, slow pace bottle feeding taught. MOB bottle fed 20 ml's of formula, baby tolerated well.     3 step plan:  Breastfeed/attempt  Supplement according to guideline volumes 10-30  Pump & hand express  Every 3 hours or sooner, feed a minimum 8 or more times in 24 hours.     Pump settings speed 80/60, suction 25-30% x 15 minutes then hand express x 2-3 minutes each breast, flange fit 25 mm. Collect drops & feed back to baby.    Information sheets provided with review:  Supplemental Guidelines 10-20-30  Storage Guidelines  HG pump rental  Hand expression

## 2025-06-08 PROCEDURE — A9270 NON-COVERED ITEM OR SERVICE: HCPCS | Performed by: OBSTETRICS & GYNECOLOGY

## 2025-06-08 PROCEDURE — 700102 HCHG RX REV CODE 250 W/ 637 OVERRIDE(OP): Performed by: OBSTETRICS & GYNECOLOGY

## 2025-06-08 PROCEDURE — 770002 HCHG ROOM/CARE - OB PRIVATE (112)

## 2025-06-08 RX ORDER — LABETALOL 100 MG/1
200 TABLET, FILM COATED ORAL TWICE DAILY
Status: DISCONTINUED | OUTPATIENT
Start: 2025-06-08 | End: 2025-06-09 | Stop reason: HOSPADM

## 2025-06-08 RX ORDER — NIFEDIPINE 30 MG/1
30 TABLET, EXTENDED RELEASE ORAL
Status: DISCONTINUED | OUTPATIENT
Start: 2025-06-08 | End: 2025-06-09 | Stop reason: HOSPADM

## 2025-06-08 RX ADMIN — ACETAMINOPHEN 1000 MG: 500 TABLET ORAL at 12:01

## 2025-06-08 RX ADMIN — IBUPROFEN 800 MG: 800 TABLET, FILM COATED ORAL at 23:02

## 2025-06-08 RX ADMIN — ACETAMINOPHEN 1000 MG: 500 TABLET ORAL at 17:12

## 2025-06-08 RX ADMIN — LABETALOL HYDROCHLORIDE 200 MG: 100 TABLET, FILM COATED ORAL at 17:12

## 2025-06-08 RX ADMIN — IBUPROFEN 800 MG: 800 TABLET, FILM COATED ORAL at 14:07

## 2025-06-08 RX ADMIN — LABETALOL HYDROCHLORIDE 200 MG: 100 TABLET, FILM COATED ORAL at 06:25

## 2025-06-08 RX ADMIN — ACETAMINOPHEN 1000 MG: 500 TABLET ORAL at 06:25

## 2025-06-08 RX ADMIN — NIFEDIPINE 30 MG: 30 TABLET, EXTENDED RELEASE ORAL at 12:01

## 2025-06-08 RX ADMIN — PRENATAL WITH FERROUS FUM AND FOLIC ACID 1 TABLET: 3080; 920; 120; 400; 22; 1.84; 3; 20; 10; 1; 12; 200; 27; 25; 2 TABLET ORAL at 08:07

## 2025-06-08 RX ADMIN — IBUPROFEN 800 MG: 800 TABLET, FILM COATED ORAL at 04:33

## 2025-06-08 RX ADMIN — NIFEDIPINE 10 MG: 10 CAPSULE ORAL at 04:33

## 2025-06-08 RX ADMIN — ACETAMINOPHEN 1000 MG: 500 TABLET ORAL at 23:01

## 2025-06-08 ASSESSMENT — PAIN DESCRIPTION - PAIN TYPE
TYPE: ACUTE PAIN

## 2025-06-08 NOTE — LACTATION NOTE
This note was copied from a baby's chart.  Lactation follow-up visit    Baby's weight loss 10.1%, mother has been working 3 step plan.    3 step plan:  1. Breastfeed  2. Supplement according to Guideline volumes  3. Pump & hand express  Every 3 hours or sooner if baby cues, feed a minimum 8 or more times in 24 hours.    Mother pumping, currently getting drops on flange. Mother reports baby may have a fast metabolism and that is why baby continues to  loose weight. Reinforced feeding baby no longer than 3 hours from last feed, 8 or more feedings in 24 hours.     Encouraged mother to call for any lactation needs.

## 2025-06-08 NOTE — CARE PLAN
The patient is Stable - Low risk of patient condition declining or worsening    Shift Goals  Clinical Goals: BP management, rest, fundus firm, lochia light  Patient Goals: rest, healthy infant  Family Goals: supportive care    Progress made toward(s) clinical / shift goals:    Problem: Knowledge Deficit - Postpartum  Goal: Patient will verbalize and demonstrate understanding of self and infant care  Outcome: Progressing  Note: Bonding with infant and father of baby in room appropriately.      Problem: Altered Physiologic Condition  Goal: Patient physiologically stable as evidenced by normal lochia, palpable uterine involution and vitals within normal limits  Outcome: Progressing  Note: Fundus firm, lochia light. Slight elevated BP tonight. Medicated per MD. Denies dizziness or headache.      Problem: Infection - Postpartum  Goal: Postpartum patient will be free of signs and symptoms of infection  Outcome: Progressing  Note: Afebrile. Denies chills.

## 2025-06-08 NOTE — PROGRESS NOTES
"0700- report received from NOC RN. POC discussed including feeding intervals, I+O documentation, latch support, lochia changes, ambulation, infant temperature management including STS and layering, weights, VS intervals, and discharge planning. Medications and other comfort measures discussed. Call light in reach. Pt would like to discharge home today if medically cleared. BPs were elevated overnight, will continue q4h VS and update MD if they have improved over shift. Pt is also 3-step feeding with infant d/t weight loss. Currently supplementing with 30ml formula after BF.     1155- MD Stephens updates on severe range /89 (best of 3 checks). Orders received. Will continue to monitor BPs and s/sx preeclampsia.     1205- Nifedipine given to pt. Pt did recheck BP several minutes after CNA left room as she reports she experiences \"white coat syndrome\" in which BP improved to 132/76 as shown on monitor at bedside. BP medication given and will recheck in 1 hour. Rest encouraged in between baby cares.   "

## 2025-06-08 NOTE — PROGRESS NOTES
Obstetrics & Gynecology Post-Delivery Progress Note    Date of Service  25    35 y.o.   s/p , Low Transverse  Delivery date: 25  Breastfeeding: Yes    Events  No events    Subjective  Pain: No  Bleeding: lochia minimal  PO's: taking regular diet  Voiding: without difficulty  Ambulating: yes  Feeding: breastfeeding well  No HA, RUQ pain, visual changes    Objective  Temp:  [36.6 °C (97.8 °F)-37.2 °C (98.9 °F)] 37.2 °C (98.9 °F)  Pulse:  [67-88] 80  Resp:  [17-20] 18  BP: (117-148)/(65-91) 147/76  SpO2:  [94 %-98 %] 97 %    Physical Exam  Exam:  Gen: A&Ox3 NAD  CV: no edema or cyanosis  Resp: normal effort  Abd: soft, NT, FF and below U. Mepilex dry and intact, no shadowing.   Ext: no edema or TTP  Psych: normal mood and affect      Lab Results   Component Value Date    RBC 3.39 (L) 2025    ABOGROUP A 2025    RH POS 2025     Immunization History   Administered Date(s) Administered    9VHPV VACCINE 2-3 DOSE IM (GARDASIL 9) 10/16/2015, 2016    HPV Quadrivalent Vaccine (GARDASIL) - HISTORICAL DATA 2015    Hepatitis A Vaccine, Adult 10/16/2015    Hepatitis A Vaccine, Ped/Adol 2012    Hepatitis B Vaccine Adolescent/Pediatric 2001, 2004, 2004    INFLUENZA TIV (IM) 2012    MMR Vaccine 2008    PFIZER PURPLE CAP SARS-COV-2 VACCINATION (12+) 2021    Tdap Vaccine 2012    Typhoid Vaccine 2012, 2012       Assessment/Plan  Criss Vieira is a 35 y.o.  postop day 2 s/p , Low Transverse.    1. Post care: meeting all goals  2. Hemodynamics: stable  3. Pain: controlled  4. PNL:   Lab Results   Component Value Date    RH POS 2025     5. Method of Feeding: plans to breastfeed  6. Method of Contraception: not discussed  7. Vaccinations:   Immunization History   Administered Date(s) Administered    9VHPV VACCINE 2-3 DOSE IM (GARDASIL 9) 10/16/2015, 2016    HPV Quadrivalent Vaccine (GARDASIL) -  HISTORICAL DATA 12/03/2015    Hepatitis A Vaccine, Adult 10/16/2015    Hepatitis A Vaccine, Ped/Adol 12/07/2012    Hepatitis B Vaccine Adolescent/Pediatric 02/02/2001, 01/28/2004, 04/28/2004    INFLUENZA TIV (IM) 12/07/2012    MMR Vaccine 07/30/2008    PFIZER PURPLE CAP SARS-COV-2 VACCINATION (12+) 05/05/2021    Tdap Vaccine 12/07/2012    Typhoid Vaccine 12/05/2012, 12/05/2012     8. Disposition: likely home postop day 3-4 for pre-eclampsia with severe features. MgSO4 dc'd at 2340 on 6/6. Had spike in BP starting early this morning. Initiating 200 mg labetalol BID. Can consider discharge if this adequately controls her blood pressures by this evening but discussed with patient that she will potentially need to stay longer depending on her blood pressure response to medication. She is asymptomatic and repeat labs drawn this AM were WNL.     No new Assessment & Plan notes have been filed under this hospital service since the last note was generated.  Service: Obstetrics & Gynecology       VTE prophylaxis: RASHIs    Gali Daley D.O.

## 2025-06-08 NOTE — PROGRESS NOTES
/90, HR 90. Fundus firm, lochia light. Denies dizziness. Reports adequate pain management. MD notified. Order for labs and nifedifine. Pt aware and verbalized understanding.

## 2025-06-09 ENCOUNTER — PHARMACY VISIT (OUTPATIENT)
Dept: PHARMACY | Facility: MEDICAL CENTER | Age: 35
End: 2025-06-09
Payer: COMMERCIAL

## 2025-06-09 VITALS
HEIGHT: 71 IN | HEART RATE: 94 BPM | OXYGEN SATURATION: 97 % | WEIGHT: 179 LBS | BODY MASS INDEX: 25.06 KG/M2 | RESPIRATION RATE: 18 BRPM | SYSTOLIC BLOOD PRESSURE: 142 MMHG | DIASTOLIC BLOOD PRESSURE: 89 MMHG | TEMPERATURE: 98 F

## 2025-06-09 PROCEDURE — 700102 HCHG RX REV CODE 250 W/ 637 OVERRIDE(OP): Performed by: OBSTETRICS & GYNECOLOGY

## 2025-06-09 PROCEDURE — RXMED WILLOW AMBULATORY MEDICATION CHARGE: Performed by: OBSTETRICS & GYNECOLOGY

## 2025-06-09 PROCEDURE — A9270 NON-COVERED ITEM OR SERVICE: HCPCS | Performed by: OBSTETRICS & GYNECOLOGY

## 2025-06-09 RX ORDER — OXYCODONE HYDROCHLORIDE 5 MG/1
5 TABLET ORAL EVERY 4 HOURS PRN
Qty: 15 TABLET | Refills: 0 | Status: SHIPPED | OUTPATIENT
Start: 2025-06-09 | End: 2025-06-12

## 2025-06-09 RX ORDER — LABETALOL 200 MG/1
200 TABLET, FILM COATED ORAL 2 TIMES DAILY
Qty: 60 TABLET | Refills: 1 | Status: SHIPPED | OUTPATIENT
Start: 2025-06-09

## 2025-06-09 RX ORDER — IBUPROFEN 800 MG/1
800 TABLET, FILM COATED ORAL EVERY 8 HOURS PRN
Qty: 40 TABLET | Refills: 1 | Status: SHIPPED | OUTPATIENT
Start: 2025-06-09

## 2025-06-09 RX ORDER — NIFEDIPINE 30 MG/1
30 TABLET, EXTENDED RELEASE ORAL DAILY
Qty: 30 TABLET | Refills: 1 | Status: SHIPPED | OUTPATIENT
Start: 2025-06-10

## 2025-06-09 RX ORDER — PSEUDOEPHEDRINE HCL 30 MG
100 TABLET ORAL 2 TIMES DAILY PRN
Qty: 30 CAPSULE | Refills: 0 | Status: SHIPPED | OUTPATIENT
Start: 2025-06-09

## 2025-06-09 RX ADMIN — LABETALOL HYDROCHLORIDE 200 MG: 100 TABLET, FILM COATED ORAL at 05:51

## 2025-06-09 RX ADMIN — NIFEDIPINE 30 MG: 30 TABLET, EXTENDED RELEASE ORAL at 05:52

## 2025-06-09 RX ADMIN — ACETAMINOPHEN 1000 MG: 500 TABLET ORAL at 11:45

## 2025-06-09 RX ADMIN — PRENATAL WITH FERROUS FUM AND FOLIC ACID 1 TABLET: 3080; 920; 120; 400; 22; 1.84; 3; 20; 10; 1; 12; 200; 27; 25; 2 TABLET ORAL at 07:46

## 2025-06-09 RX ADMIN — IBUPROFEN 800 MG: 800 TABLET, FILM COATED ORAL at 07:03

## 2025-06-09 RX ADMIN — ACETAMINOPHEN 1000 MG: 500 TABLET ORAL at 05:52

## 2025-06-09 ASSESSMENT — PAIN DESCRIPTION - PAIN TYPE
TYPE: ACUTE PAIN
TYPE: ACUTE PAIN;SURGICAL PAIN
TYPE: ACUTE PAIN

## 2025-06-09 NOTE — LACTATION NOTE
This note was copied from a baby's chart.  Lactation follow-up visit    Baby's weight loss 7.91% (gained), couplet to be discharged today. Encouraged mother to continue 3 step plan.    3 step plan:  Breastfeed  Supplement according to guideline volumes  Pump & hand express  Every 3 hours or sooner if baby cues, feed a minimum 8 or more times in 24 hours.     Invited to the [facility], Tues & Threba. Mother states she feels comfortable going home & working plan.

## 2025-06-09 NOTE — DISCHARGE INSTRUCTIONS
PATIENT DISCHARGE EDUCATION INSTRUCTION SHEET    REASONS TO CALL YOUR OBSTETRICIAN  Persistent fever, shaking, chills (Temperature higher than 100.4) may indicate you have an infection  Heavy bleeding: soaking more than 1 pad per hour; Passing clots an egg-sized clot or bigger may mean you have an postpartum hemorrhage  Foul odor from vagina or bad smelling or discolored discharge or blood  Breast infection (Mastitis symptoms); breast pain, chills, fever, redness or red streaks, may feel flu like symptoms  Urinary pain, burning or frequency  Incision that is not healing, increased redness, swelling, tenderness or pain, or any pus from episiotomy or  site may mean you have an infection  Redness, swelling, warmth, or painful to touch in the calf area of your leg may mean you have a blood clot  Severe or intensified depression, thoughts or feelings of wanting to hurt yourself or someone else   Pain in chest, obstructed breathing or shortness of breath (trouble catching your breath) may mean you are having a postpartum complication. Call your provider immediately   Headache that does not get better, even after taking medicine, a bad headache with vision changes or pain in the upper right area of your belly may mean you have high blood pressure or post birth preeclampsia. Call your provider immediately    HAND WASHING  All family and friends should wash their hands:  Before and after holding the baby  Before feeding the baby  After using the restroom or changing the baby's diaper    WOUND CARE  Ask your physician for additional care instructions. In general:   Incision:  May shower and pat incision dry   Keep the incision clean and dry  There should not be any opening or pus from the incision  Continue to walk at home 3 times a day   Do NOT lift anything heavier than your baby (over 10 pounds)  Encourage family to help participate in care of the  to allow rest and mom time to  heal  Episiotomy/Laceration  May use marni-spray bottle, witch hazel pads and dermaplast spray for comfort  Use marni-spray bottle after urinating to cleanse perineal area  To prevent burning during urination spray marni-water bottle on labial area   Pat perineal area dry until episiotomy/laceration is healed  Continue to use marni-bottle until bleeding stops as needed  If have a 2nd degree laceration or greater, a Sitz bath can offer relief from soreness, burning, and inflammation   Sitz Bath   Sit in 6 inches of warm water and soak laceration as needed until the laceration heals    VAGINAL CARE AND BLEEDING  Nothing inside vagina for 6 weeks:   No sexual intercourse, tampons or douching  Bleeding may continue for 2-4 weeks. Amount and color may vary  Soaking 1 pad or more in an hour for several hours is considered heavy bleeding  Passing large egg sized blood clots can be concerning  If you feel like you have heavy bleeding or are having increasing amount of blood clots call your Obstetrician immediately  If you begin feeling faint upon standing, feeling sick to your stomach, have clammy skin, a really fast heartbeat, have chills, start feeling confused, dizzy, sleepy or weak, or feeling like you're going to faint call your Obstetrician immediately    HYPERTENSION   Preeclampsia or gestational hypertension are types of high blood pressure that only pregnant women can get. It is important for you to be aware of symptoms to seek early intervention and treatment. If you have any of these symptoms immediately call your Obstetrician    Vision changes or blurred vision   Severe headache or pain that is unrelieved with medication and will not go away  Persistent pain in upper abdomen or shoulder   Increased swelling of face, feet, or hands  Difficulty breathing or shortness of breath at rest  Urinating less than usual    URINATION AND BOWEL MOVEMENTS  Eating more fiber (bran cereal, fruits, and vegetables) and drinking  "plenty of fluids will help to avoid constipation  Urinary frequency and urgency after childbirth is normal  If you experience any urinary pain, burning or frequency call your provider    BIRTH CONTROL  It is possible to become pregnant at any time after delivery and while breastfeeding  Plan to discuss a method of birth control with your physician at your post delivery follow up visit    POSTPARTUM BLUES  During the first few days after birth, you may experience a sense of the \"blues\" which may include impatience, irritability or even crying. These feelings come and go quickly. However, as many as 1 in 10 women experience emotional symptoms known as postpartum depression.     POSTPARTUM DEPRESSION    May start as early as the second or third day after delivery or take several weeks or months to develop. Symptoms of \"blues\" are present, but are more intense: Crying spells; loss of appetite; feelings of hopelessness or loss of control; fear of touching the baby; over concern or no concern at all about the baby; little or no concern about your own appearance/caring for yourself; and/or inability to sleep or excessive sleeping. Contact your Obstetrician if you are experiencing any of these symptoms     PREVENTING SHAKEN BABY  If you are angry or stressed, PUT THE BABY IN THE CRIB, step away, take some deep breaths, and wait until you are calm to care for the baby. DO NOT SHAKE THE BABY. You are not alone, call a supporter for help.  [facility]  crisis call line (980-984-9593) or (1-974.967.3366)  You can also text them, text \"ANSWER\" (262261)   PATIENT DISCHARGE EDUCATION INSTRUCTION SHEET    REASONS TO CALL YOUR PEDIATRICIAN  Projectile or forceful vomiting for more than one feeding  Unusual rash lasting more than 24 hours  Very sleepy, difficult to wake up  Bright yellow or pumpkin colored skin with extreme sleepiness  Temperature below 97.6 or above 100.4 F rectally  Feeding problems  Breathing " problems  Excessive crying with no known cause  If cord starts to become red, swollen, develops a smell or discharge  No wet diaper or stool in a 24 hour time period     SAFE SLEEP POSITIONING FOR YOUR BABY  The [employer] Academy [employer] Pediatric[employer] advises your baby should be placed on his/her back for  Sleeping to reduce the risk of Sudden Infant Death Syndrome (SIDS)  Baby should sleep by themselves in a crib, portable crib or bassinet  Baby should not share a bed with his/her parents  Baby should be placed on his or her back to sleep, night time and at naps  Baby should sleep on firm mattress with a tightly fitted sheet  NO couches, waterbeds or anything soft  Baby's sleep area should not contain any loose blankets, comforters, stuffed animals or any other soft items, (pillows, bumper pads, etc. ...)  Baby's face should be kept uncovered at all times  Baby should sleep in a smoke-free environment  Do not dress baby too warmly to prevent overheating    HAND WASHING  All family and friends should wash their hands:  Before and after holding the baby  Before feeding the baby  After using the restroom or changing the baby's diaper    TAKING BABY'S TEMPERATURE   If you feel your baby may have a fever take your baby's temperature per thermometer instructions  If taking axillary temperature place thermometer under baby's armpit and hold arm close to body  The most precise and accurate way to take a temperature is rectally  Turn on the digital thermometer and lubricate the tip of the thermometer with petroleum jelly.  Lay your baby or child on his or her back, lift his or her thighs, and insert the lubricated thermometer 1/2 to 1 inch (1.3 to 2.5 centimeters) into the rectum  Call your Pediatrician for temperature lower than 97.6 or greater than 100.4 F rectally    BATHE AND SHAMPOO BABY  Gently wash baby with a soft cloth using warm water and mild soap - rinse well  Do not put baby in tub bath until umbilical cord falls off  and appears well-healed  Bathing baby 2-3 times a week might be enough until your baby becomes more mobile. Bathing your baby too much can dry out his or her skin     NAIL CARE  First recommendation is to keep them covered to prevent facial scratching  During the first few weeks,  nails are very soft. Doctors recommend using only a fine emery board. Don't bite or tear your baby's nails. When your baby's nails are stronger, after a few weeks, you can switch to clippers or scissors making sure not to cut too short and nip the quick   A good time for nail care is while your baby is sleeping and moving less     CORD CARE  Fold diaper below umbilical cord until cord falls off  Keep umbilical cord clean and dry  May see a small amount of crust around the base of the cord. Clean off with mild soap and water and dry       DIAPER AND DRESS BABY  For baby girls: gently wipe from front to back. Mucous or pink tinged drainage is normal  For uncircumcised baby boys: do NOT pull back the foreskin to clean the penis. Gently clean with wipes or warm, soapy water  Dress baby in one more layer of clothing than you are wearing  Use a hat to protect from sun or cold. NO ties or drawstrings    URINATION AND BOWEL MOVEMENTS  If formula feeding or when breast milk feeding is established, your baby should wet 6-8 diapers a day and have at least 2 bowel movements a day during the first month  Bowel movements color and type can vary from day to day    INFANT FEEDING  Most newborns feed 8-12 times, every 24 hours. YOU MAY NEED TO WAKE YOUR BABY UP TO FEED  If breastfeeding, offer both breasts when your baby is showing feeding cues, such as rooting or bringing hand to mouth and sucking  Common for  babies to feed every 1-3 hours   Only allow baby to sleep up to 4 hours in between feeds if baby is feeding well at each feed. Offer breast anytime baby is showing feeding cues and at least every 3 hours  Follow up with outpatient  [facility] for continued breast feeding support    FORMULA FEEDING  Feed baby formula every 2-3 hours when your baby is showing feeding cues  Paced bottle feeding will help baby not over eat at each feed     BOTTLE FEEDING   Paced Bottle Feeding is a method of bottle feeding that allows the infant to be more in control of the feeding pace. This feeding method slows down the flow of milk into the nipple and the mouth, allowing the baby to eat more slowly, and take breaks. Paced feeding reduces the risk of overfeeding that may result in discomfort for the baby   Hold baby almost upright or slightly reclined position supporting the head and neck  Use a small nipple for slow-flowing. Slow flow nipple holes help in controlling flow   Don't force the bottle's nipple into your baby's mouth. Tickle babies lip so baby opens their mouth  Insert nipple and hold the bottle flat  Let the baby suck three to four times without milk then tip the bottle just enough to fill the nipple about correction with milk  Let baby suck 3-5 continuous swallows, about 20-30 seconds tip the bottle down to give the baby a break  After a few seconds, when the baby begins to suck again, tip bottle up to allow milk to flow into the nipple  Continue to Pace feed until baby shows signs of fullness; no longer sucking after a break, turning away or pushing away the nipple   Bottle propping is not a recommended practice for feeding  Bottle propping is when you give a baby a bottle by leaning the bottle against a pillow, or other support, rather than holding the baby and the bottle.  Forces your baby to keep up with the flow, even if the baby is full   This can increase your baby's risk of choking, ear infections, and tooth decay    BOTTLE PREPARATION   Never feed  formula to your baby, or use formula if the container is dented  When using ready-to-feed, shake formula containers before opening  If formula is in a can, clean the lid of any  "dust, and be sure the can opener is clean  Formula does not need to be warmed. If you choose to feed warmed formula, do not microwave it. This can cause \"hot spots\" that could burn your baby. Instead, set the filled bottle in a bowl of warm (not boiling) water or hold the bottle under warm tap water. Sprinkle a few drops of formula on the inside of your wrist to make sure it's not too hot  Measure and pour desired amount of water into baby bottle  Add unpacked, level scoop(s) of powder to the bottle as directed on formula container. Return dry scoop to can  Put the cap on the bottle and shake. Move your wrist in a twisting motion helps powder formula mix more quickly and more thoroughly  Feed or store immediately in refrigerator  You need to sterilize bottles, nipples, rings, etc., only before the first use    CLEANING BOTTLE  Use hot, soapy water  Rinse the bottles and attachments separately and clean with a bottle brush  If your bottles are labelled  safe, you can alternatively go ahead and wash them in the    After washing, rinse the bottle parts thoroughly in hot running water to remove any bubbles or soap residue   Place the parts on a bottle drying rack   Make sure the bottles are left to drain in a well-ventilated location to ensure that they dry thoroughly    CAR SEAT  For your baby's safety and to comply with [facility] you will need to bring a car seat to the hospital before taking your baby home. Please read your car seat instructions before your baby's discharge from the hospital.  Make sure you place an emergency contact sticker on your baby's car seat with your baby's identifying information  Car seat should not be placed in the front seat of a vehicle. The car seat should be placed in the back seat in the rear-facing position.  Car seat information is available through [employer] at [phone] and also at [email or web address]        "

## 2025-06-09 NOTE — PROGRESS NOTES
Discharge instructions completed with patient and spouse. All questions answered. Patient stated understanding of need for follow up appointments and when to reach out to OB. Awaiting meds to beds at this time for discharge.

## 2025-06-09 NOTE — DISCHARGE SUMMARY
Discharge Summary:      Criss Vieira    Admit Date:   6/3/2025  Discharge Date:  2025     Admitting diagnosis:  Indication for care in labor or delivery [O75.9]  Discharge Diagnosis: Status post  for arrest of descent.  Pregnancy Complications: preeclampsia  Tubal Ligation:  no        History:  Past Medical History[1]  OB History    Para Term  AB Living   1 0 0 0 0 0   SAB IAB Ectopic Molar Multiple Live Births   0 0 0  0       # Outcome Date GA Lbr Suraj/2nd Weight Sex Type Anes PTL Lv   1 Current                 Patient has no known allergies.  Patient Active Problem List    Diagnosis Date Noted    Postpartum hypertension 2025    FH: breast cancer in first degree relative when <50 years old 2016        Hospital Course:   35 y.o. , now para 1, was admitted with the above mentioned diagnosis, underwent Induction of Labor and Blood Pressure Control, ultimately resulted in a  for failure to descend. Patient postpartum course was unremarkable, she was on mag sulfate 24hrs postpartum. She was started on labetalol 200mg BID. She had progressive advancement in diet, ambulation and toleration of oral analgesia. Patient without complaints today and desires discharge. Pt checking her BP in the room and they are all 120s/80s. She has known severe white coat HTN which contributes to her elevated blood pressures when checked by staff.    Vitals:    25 2200 25 0200 25 0551 25 0930   BP: (!) 150/95 (!) 154/94 (!) 149/101 (!) 142/89   Pulse: 99 100  94   Resp: 18 18 18 18   Temp: 37.1 °C (98.8 °F) 36.7 °C (98 °F) 36.7 °C (98 °F) 36.7 °C (98 °F)   TempSrc: Temporal Temporal Temporal Temporal   SpO2: 99% 97% 98% 97%   Weight:       Height:           Current Facility-Administered Medications   Medication Dose    labetalol (Normodyne) tablet 200 mg  200 mg    NIFEdipine SR (Procardia-XL) tablet 30 mg  30 mg    calcium GLUConate injection 1 g  1 g     magnesium sulfate 40 g/1000mL infusion  2 g/hr    ibuprofen (Motrin) tablet 800 mg  800 mg    Followed by    ibuprofen (Motrin) tablet 800 mg  800 mg    acetaminophen (Tylenol) tablet 1,000 mg  1,000 mg    Followed by    [START ON 6/10/2025] acetaminophen (Tylenol) tablet 1,000 mg  1,000 mg    oxyCODONE immediate-release (Roxicodone) tablet 5 mg  5 mg    oxyCODONE immediate release (Roxicodone) tablet 10 mg  10 mg    ondansetron (Zofran) syringe/vial injection 4 mg  4 mg    Or    ondansetron (Zofran ODT) dispertab 4 mg  4 mg    diphenhydrAMINE (Benadryl) tablet/capsule 25 mg  25 mg    Or    diphenhydrAMINE (Benadryl) injection 25 mg  25 mg    docusate sodium (Colace) capsule 100 mg  100 mg    tetanus-dipth-acell pertussis (Tdap) inj 0.5 mL  0.5 mL    measles, mumps and rubella vaccine (Mmr) injection 0.5 mL  0.5 mL    bisacodyl (Dulcolax) suppository 10 mg  10 mg    magnesium hydroxide (Milk Of Magnesia) suspension 30 mL  30 mL    prenatal plus vitamin (Stuartnatal 1+1) 27-1 MG tablet 1 Tablet  1 Tablet    simethicone (Mylicon) chewable tablet 125 mg  125 mg       Exam:  Breast Exam: negative  Abdomen: Abdomen soft, non-tender. BS normal. No masses,  No organomegaly  Fundus Non Tender: yes  Incision: dry and intact  Extremity: extremities, peripheral pulses and reflexes normal     Labs:  Recent Labs     06/07/25  0515 06/07/25  2252   WBC 17.2* 15.4*   RBC 3.39* 3.57*   HEMOGLOBIN 10.7* 11.3*   HEMATOCRIT 31.9* 32.7*   MCV 94.1 91.6   MCH 31.6 31.7   MCHC 33.5 34.6   RDW 45.5 43.9   PLATELETCT 251 298   MPV 10.8 10.2        Activity:   Discharge to home  Pelvic Rest x 6 weeks    Assessment:  POD#3 s/p primary LTCS for arrest of descent  PIH with severe features by blood pressure criteria s/p magnesium sulfate  Discharge Assessment: Stable postpartum, patient has severe white coat hypertension, her blood pressures are normal on her monitoring 120s/80s, she is asymptomatic and has no signs of severe preeclampsia      Follow up: Dr. Amador in 1 week for incision check.      Discharge Meds:   Current Outpatient Medications   Medication Sig Dispense Refill    docusate sodium 100 MG Cap Take 100 mg by mouth 2 times a day as needed for Constipation. 30 Capsule 0    ibuprofen (MOTRIN) 800 MG Tab Take 1 Tablet by mouth every 8 hours as needed for Mild Pain or Moderate Pain. 40 Tablet 1    oxyCODONE immediate-release (ROXICODONE) 5 MG Tab Take 1 Tablet by mouth every four hours as needed for Severe Pain for up to 3 days. 15 Tablet 0   Continue labetalol 200mg PO BID, Nifedipine XL 30mg daily        Viky Amador M.D.               [1]   Past Medical History:  Diagnosis Date    Urinary tract infection, site not specified 2013

## 2025-06-09 NOTE — CARE PLAN
The patient is Stable - Low risk of patient condition declining or worsening    Shift Goals  Clinical Goals: lochia WDL  Patient Goals: pain WDL  Family Goals: bond    Progress made toward(s) clinical / shift goals:    Problem: Knowledge Deficit - L&D  Goal: Patient and family/caregivers will demonstrate understanding of plan of care, disease process/condition, diagnostic tests and medications  Description: Target End Date:  1-3 days or as soon as patient condition allows1.  Oriented to unit, equipment, visitation policy and means for communicating concern2.  Complete/review Learning Assessment3.  Assess patient's preparation, knowledge level and expectations4.  Provide accurate information in basic terms, clarify misconceptions5.  Explain disease process/condition, treatment plan, diagnostic tests and medications6.  Encourage patient and support person to ask questions and verbalize their understanding7.  Instruct patient/support person in basic interpretation of fetal monitor8.  Obtain informed consent when appropriate9.  Demonstrate breathing/relaxation techniques  Outcome: Progressing     Problem: Psychosocial - L&D  Goal: Patient's level of anxiety will decrease  Description: Target End Date:  1-3 days or as soon as patient condition allows1.  Collaborate with patient and family/caregiver to identify triggers and develop strategies to cope with anxiety2.  Implement stimuli reduction, calming techniques3.  Pharmacologic management per provider order4.  Encourage patient/family/care giver participation5.  Collaborate with interdisciplinary team including Psychologist or Behavioral Health Team as needed  Outcome: Progressing  Goal: Patient will be able to discuss coping skills during hospitalization  Description: Target End Date:  1 to 3 days1.  Assess patient and support person's emotional response2.  Review patient and support person's participation and role in birth experience.  Identify positive behaviors  during the process3.  Encourage presence/participation of support person  Outcome: Progressing  Goal: Patient's ability to re-evaluate and adapt role responsibilities will improve  Description: Target End Date:  Prior to discharge or change in level of care1.  Assess family support2.  Encourage support system participation in care3.  Encouraged verbalization of feelings regarding caregiver responsibilities4.  Discuss changes in role and responsibilities caused by patient's condition  Outcome: Progressing  Goal: Spiritual and cultural needs incorporated into hospitalization  Description: Target End Date:  End of day 11.  Encourage verbalization of feelings, concerns, expectations and needs2.  Collaborate with Case Management/Social Services3.  Collaborate with Pastoral Care to meet spiritual needs  Outcome: Progressing     Problem: Risk for Venous Thromboembolism (VTE)  Goal: VTE prevention measures will be implemented and patient will remain free from VTE  Description: Target End Date:  Prior to discharge or change in level of care1.  Intermittent sequential compression device in place 23 hours per day or per provider order2.  Pharmacologic prophylaxis per provider order3.  Monitor for anticoagulation complications4.  Educate patient and family/caregiver about the importance of intermittent sequential and early ambulation when able5.  Educate/demonstrate to patient and family/caregiver about ankle flex, foot rotation and knee flex exercises in addition to other prophylactic measures every hour while awake  Outcome: Progressing     Problem: Risk for Infection and Impaired Wound Healing  Goal: Patient will remain free from infection  Description: Target End Date:  1 to 3 days1.  Utilize Standard Precautions at all times to reduce the risk of transmission of microorganisms from both recognized and unrecognized sources of infection2.  Infection prevention handouts provided (general/device/diagnosis specific) and  documented in Patient Education3.  Limit vaginal exams as necessary4.  Use aseptic technique during vaginal exams5.  Administer antibiotic therapy per provider order6.  Assess for removal of lines and drains7.  Line/drain care per policy and/or provider orders  Outcome: Progressing  Goal: Patient's wound/surgical incision will decrease in size and heals properly  Description: Target End Date:  Prior to discharge or change in level of careDocument on LDA1.  Assess and document surgical incision/wound2.  Provide incision/wound care per policy and/or provider orders3.  Manage surgical drains per policy if applicable4.  Encourage adequate nutrition to promote wound healing5.  Collaborate with Clinical Dietician  Outcome: Progressing     Problem: Risk for Fluid Imbalance  Goal: Patient's fluid volume balance will be maintained or improve  Description: Target End Date:  1 to 3 days1.  Assess for signs and symptoms of postpartum and postoperative bleeding as appropriate2.  Monitor drainage for color, consistency, quantity3.  Report inadequate intake or output to provider4.  Promote oral intake as appropriate5.  Administer IV therapy as ordered6.  Discontinue IV fluids when tolerating PO or per provider order  Outcome: Progressing     Problem: Risk for Injury  Goal: Patient and fetus will be free of preventable injury/complications  Description: Target End Date:  Prior to discharge or change in level of care1.  Monitor uterine activity and if applicable progression of labor2.  Monitor fetal well being3.  Assure resuscitative equipment is available and within reach  Outcome: Progressing     Problem: Pain  Goal: Patient's pain will be alleviated or reduced to the patient’s comfort goal  Description: Target End Date:  Prior to discharge or change in level of care1.  Document pain using the appropriate pain scale per order or unit policy2.  Administer pain medications per provider order and/or assist with epidural/spinal  placement as needed3.  Pain management medications as ordered4.  Educate and implement non-pharmacologic comfort measures (i.e. relaxation, distraction, massage, cold/heat therapy, etc.)5.  Assess for nonverbal signs of ineffective coping with pain and offer pain medication and/or epidural anesthesia  Outcome: Progressing     Problem: Discharge Barriers/Planning  Goal: Patient's continuum of care needs are met  Description: Target End Date:  Prior to discharge or change in level of care1.  Identify potential discharge barriers on admission and throughout hospitalization2.  Collaborate with Case Management, , Clinical Educators, Navigators and others on the transitional care team to meet discharge needs3.  Involve patient/family/caregivers in setting and prioritizing goals for hospitalization and discharge4.  Ensure Flu vaccinations are addressed5.  Inquire if patient is interested in the Meds to Bed program6.  Ensure patient and family/caregiver are able to demonstrate use of equipment as prescribed7.  Ensure patient and family/caregiver can verbalize understanding of patient education8.  Explain discharge instructions and medication reconciliation to patient and family/caregiver  Outcome: Progressing     Problem: Pain - Standard  Goal: Alleviation of pain or a reduction in pain to the patient’s comfort goal  Description: Target End Date:  Prior to discharge or change in level of careDocument on Vitals flowsheet1.  Document pain using the appropriate pain scale per order or unit policy2.  Educate and implement non-pharmacologic comfort measures (i.e. relaxation, distraction, massage, cold/heat therapy, etc.)3.  Pain management medications as ordered4.  Reassess pain after pain med administration per policy5.  If opiods administered assess patient's response to pain medication is appropriate per POSS sedation scale6.  Follow pain management plan developed in collaboration with patient and  interdisciplinary team (including palliative care or pain specialists if applicable)  Outcome: Progressing     Problem: Knowledge Deficit - Standard  Goal: Patient and family/care givers will demonstrate understanding of plan of care, disease process/condition, diagnostic tests and medications  Description: Target End Date:  1-3 days or as soon as patient condition allowsDocument in Patient Education1.  Patient and family/caregiver oriented to unit, equipment, visitation policy and means for communicating concern2.  Complete/review Learning Assessment3.  Assess knowledge level of disease process/condition, treatment plan, diagnostic tests and medications4.  Explain disease process/condition, treatment plan, diagnostic tests and medications  Outcome: Progressing     Problem: Knowledge Deficit - Postpartum  Goal: Patient will verbalize and demonstrate understanding of self and infant care  Description: Target End Date:  1-3 days or as soon as patient condition allowsDocument in Patient Education1.  Assess patient and knowledge of self and infant care2.  Educate patient verbally, by demonstration and written material  Target End Date:  1-3 days or as soon as patient condition allowsDocument in Patient Education1.  Assess patient and knowledge of self and infant care2.  Educate patient verbally, by demonstration and written material  Outcome: Progressing     Problem: Psychosocial - Postpartum  Goal: Patient will verbalize and demonstrate effective bonding and parenting behavior  Description: Target End Date:  1 to 4 days1.  Assess patient for anxiety or apprehension regarding parenting role2.  Provide emotional support and encouragement to patient/family/caregiver  Target End Date:  1 to 4 days1.  Assess patient for anxiety or apprehension regarding parenting role2.  Provide emotional support and encouragement to patient/family/caregiver  Outcome: Progressing     Problem: Altered Physiologic Condition  Goal: Patient  physiologically stable as evidenced by normal lochia, palpable uterine involution and vitals within normal limits  Description: Target End Date:  1 to 4 daysDocument on Assessment flowsheet1.  Perform physical assessment and obtain vitals per intrapartum/postpartum standards of care2.  Follow epidural/spinal narcotic protocol if patient has received a long acting narcotic3.  Massage fundus as necessary to prevent excessive lochia4.  Administer pitocin, methergine, cytotec or hemabate as ordered  Target End Date:  1 to 4 daysDocument on Assessment flowsheet1.  Perform physical assessment and obtain vitals per intrapartum/postpartum standards of care2.  Follow epidural/spinal narcotic protocol if patient has received a long acting narcotic3.  Massage fundus as necessary to prevent excessive lochia4.  Administer pitocin, methergine, cytotec or hemabate as ordered  Outcome: Progressing     Problem: Infection - Postpartum  Goal: Postpartum patient will be free of signs and symptoms of infection  Description: Target End Date:  Target End Date:  1 to 4 days1.  Instruct patient in pericare/incisional care2.  Give antibiotics as indicated an ordered3.  Get patient out of bed and ambulating as ordered  Target End Date:  Target End Date:  1 to 4 days1.  Instruct patient in pericare/incisional care2.  Give antibiotics as indicated an ordered3.  Get patient out of bed and ambulating as ordered  Outcome: Progressing     Problem: Respiratory/Oxygenation Function Post-Surgical  Goal: Patient will achieve/maintain normal respiratory rate/effort  Description: Target End Date:  Prior to discharge or change in level of careDocument on Assessment flowsheet1.   Assess and monitor rate, rhythm, depth and effort of respiration2.   Ensure continuous pulse oximetry in use3.   Assess O2 saturation, administer/titrate oxygen as ordered4.   Educate patient on importance of turning, coughing and deep breathing5.   Educate patient on splinting  techniques during deep breathing and coughing6.   Encourage use of incentive spirometer (at least 5 to 10 times per hour)7.   Position patient for maximum ventilatory efficiency8.   Collaborate with RT to administer medication/treatments per order9.   Airway suctioning as [id number]. Oral [id number]. Alternate physical activity with rest periods  Target End Date:  Prior to discharge or change in level of careDocument on Assessment flowsheet1.   Assess and monitor rate, rhythm, depth and effort of respiration2.   Ensure continuous pulse oximetry in use3.   Assess O2 saturation, administer/titrate oxygen as ordered4.   Educate patient on importance of turning, coughing and deep breathing5.   Educate patient on splinting techniques during deep breathing and coughing6.   Encourage use of incentive spirometer (at least 5 to 10 times per hour)7.   Position patient for maximum ventilatory efficiency8.   Collaborate with RT to administer medication/treatments per order9.   Airway suctioning as [id number]. Oral [id number]. Alternate physical activity with rest periods  Outcome: Progressing     Problem: Bowel Elimination - Post Surgical  Goal: Patient will resume regular bowel sounds and function with no discomfort or distention  Description: Target End Date:  1 - 3 days post op1.  Monitor bowel sounds every shift2.  Assess for flatus3.  Monitor for abdominal distention4.  Monitor for abdominal discomfort5.  Initiate bowel protocol on post op day 2 if no bowel movement6.  Note date of last BM7.  Educate about diet, fluid intake, medication and activity to promote bowel function8.  Educate signs and symptoms of constipation and interventions to implement9.  Pharmacologic bowel management per provider order10. Regular toileting fardiwjd20. Upright position for [id number]. High fiber diet13. Encourage [id number]. Collaborate with Clinical Wvlheyegc32. Care and maintenance of ostomy if applicable  Target End Date:  1 - 3 days post  op1.  Monitor bowel sounds every shift2.  Assess for flatus3.  Monitor for abdominal distention4.  Monitor for abdominal discomfort5.  Initiate bowel protocol on post op day 2 if no bowel movement6.  Note date of last BM7.  Educate about diet, fluid intake, medication and activity to promote bowel function8.  Educate signs and symptoms of constipation and interventions to implement9.  Pharmacologic bowel management per provider order10. Regular toileting xlhozfic41. Upright position for [id number]. High fiber diet13. Encourage vcruksfnv48. Collaborate with Clinical Sytjjvkiw58. Care and maintenance of ostomy if applicable  Outcome: Progressing     Problem: Early Mobilization - Post Surgery  Goal: Early mobilization post surgery  Description: Target End Date:  Post op day 11.  Out of bed on post op day 0, unless contraindicated2.  Ambulate three times a day post-op day one, advance activity as tolerated3.  Up in chair for meals4.  Pain management adequate to allow progressive mobilization5.  Don/doff brace/corset as ordered  Target End Date:  Post op day 11.  Out of bed on post op day 0, unless contraindicated2.  Ambulate three times a day post-op day one, advance activity as tolerated3.  Up in chair for meals4.  Pain management adequate to allow progressive mobilization5.  Don/doff brace/corset as ordered  Outcome: Progressing       Patient is not progressing towards the following goals:

## 2025-06-09 NOTE — PROGRESS NOTES
Pt BP elevated, no blurred vision/headache/upper epigastric pain noted.  Orders to notify MD with pressures in the 150s/100s.  Dr. Stephens notified, no f/u required at this time.  MD verbally stated to increase BP parameters to 160/110.  Will continue to monitor and report as needed.

## 2025-06-09 NOTE — PROGRESS NOTES
POD#3  S)  pt really want to go home  O) /89, current 149/101  Inc: c/d/i, mepilex covering  Ext: no edema  Hgb: 11.3  A/P POD#3, s/p primary c/s for arrest of descent  Pre-e with severe features, s/p mag for 24hrs   - labetolol 200mg bid started yesterday morning, procardia 30mg xl added yesterday afternoon  - bp still elevated - pt states white coat syndrome - states Dr. Vásquez will understand - desires to leave ama - I advise she stay the morning so we can observe bp - she agrees

## 2025-06-09 NOTE — CARE PLAN
The patient is Watcher - Medium risk of patient condition declining or worsening    Shift Goals  Clinical Goals: lochia WDL  Patient Goals: pain WDL  Family Goals: bond    Progress made toward(s) clinical / shift goals:    Problem: Knowledge Deficit - Postpartum  Goal: Patient will verbalize and demonstrate understanding of self and infant care  Outcome: Progressing       Patient is not progressing towards the following goals:      Problem: Altered Physiologic Condition  Goal: Patient physiologically stable as evidenced by normal lochia, palpable uterine involution and vitals within normal limits  Outcome: Not Progressing   Elevated BPs into severe range at times. MD notified and medications adjusted. Q4h VS in place. Denies s/sx preeclampsia.

## (undated) DEVICE — MAT PATIENT POSITIONING PREVALON (10EA/CA)

## (undated) DEVICE — CANISTER SUCTION 3000ML MECHANICAL FILTER AUTO SHUTOFF MEDI-VAC NONSTERILE LF DISP (40EA/CA)

## (undated) DEVICE — KIT  I.V. START (100EA/CA)

## (undated) DEVICE — SUTURE 4-0 MONOCRYL PLUS PS-1 - 27 INCH (36/BX)

## (undated) DEVICE — TUBING CLEARLINK DUO-VENT - C-FLO (48EA/CA)

## (undated) DEVICE — KIT SKIN NOSE AND MOUTH PRE-OP (20/CA)

## (undated) DEVICE — PENCIL ELECTSURG 10FT HLSTR - WITH BLADE (50EA/CA)

## (undated) DEVICE — BLANKET UNDERBODY FULL ACCES - (5/CA)

## (undated) DEVICE — SET EXTENSION WITH 2 PORTS (48EA/CA) ***PART #2C8610 IS A SUBSTITUTE*****

## (undated) DEVICE — DRESSING POST OP BORDER 4 X 10 (5EA/BX)

## (undated) DEVICE — TRAY FOLEY CATHETER STATLOCK 16FR SURESTEP (10EA/CA)

## (undated) DEVICE — SLEEVE VASO DVT COMPRESSION CALF MED - (10PR/CA)

## (undated) DEVICE — BLANKET STERILE CHICKIE FOR L&D (100/CA)

## (undated) DEVICE — SUTURE 0 VICRYL PLUS CT-1 - 36 INCH (36/BX)

## (undated) DEVICE — TRAY SPINAL ANESTHESIA NON-SAFETY (20/CA)

## (undated) DEVICE — ADHESIVE DERMABOND HVD MINI (12EA/BX)

## (undated) DEVICE — GLOVE BIOGEL INDICATOR SZ 6 SURGICAL PF LTX -(50/BX)

## (undated) DEVICE — STAPLER SKIN DISP - (6/BX 10BX/CA) VISISTAT

## (undated) DEVICE — SPONGE SURGICAL PVP IODINE L8 IN (20EA/CA)

## (undated) DEVICE — SOLUTION PLASMA-LYTE PH 7.4 INJ 1000ML (14EA/CA)

## (undated) DEVICE — ELECTRODE DUAL RETURN W/ CORD - (50/PK)

## (undated) DEVICE — CHLORAPREP 26 ML APPLICATOR - ORANGE TINT(25/CA)

## (undated) DEVICE — PACK C-SECTION (2EA/CA)

## (undated) DEVICE — GLOVE BIOGEL SZ 6 PF LATEX - (50EA/BX 4BX/CA)

## (undated) DEVICE — SUTURE ABSORBABLE MONOFILAMENT VIOLET MONOCRYL CT-1 L36 IN (36EA/BX)

## (undated) DEVICE — HEAD HOLDER JUNIOR/ADULT

## (undated) DEVICE — SUTURE 0 GUT-PLAIN (36PK/BX)

## (undated) DEVICE — WATER IRRIGATION STERILE 1000ML (12EA/CA)

## (undated) DEVICE — SODIUM CHL IRRIGATION 0.9% 1000ML (12EA/CA)

## (undated) DEVICE — SUTUREABS02-0 CT1 27IN - (36EA/BX)